# Patient Record
Sex: FEMALE | Race: WHITE | NOT HISPANIC OR LATINO | Employment: UNEMPLOYED | URBAN - METROPOLITAN AREA
[De-identification: names, ages, dates, MRNs, and addresses within clinical notes are randomized per-mention and may not be internally consistent; named-entity substitution may affect disease eponyms.]

---

## 2019-01-21 ENCOUNTER — HOSPITAL ENCOUNTER (EMERGENCY)
Facility: HOSPITAL | Age: 49
Discharge: HOME | End: 2019-01-21
Attending: EMERGENCY MEDICINE
Payer: MEDICARE

## 2019-01-21 VITALS
RESPIRATION RATE: 18 BRPM | DIASTOLIC BLOOD PRESSURE: 79 MMHG | SYSTOLIC BLOOD PRESSURE: 160 MMHG | HEART RATE: 81 BPM | WEIGHT: 190 LBS | OXYGEN SATURATION: 99 % | TEMPERATURE: 97.3 F

## 2019-01-21 DIAGNOSIS — Z76.89 ENCOUNTER FOR PSYCHIATRIC ASSESSMENT: Primary | ICD-10-CM

## 2019-01-21 LAB
AMPHETAMINES SERPL QL SCN: NEGATIVE
BARBITURATES UR QL: NEGATIVE
BENZODIAZ UR QL: NEGATIVE
COCAINE UR QL: NEGATIVE
ETHANOL EXG-MCNC: 0 MG/DL
METHADONE UR QL: NEGATIVE
OPIATES UR QL SCN: NEGATIVE
PCP UR QL: NEGATIVE
THC UR QL: NEGATIVE

## 2019-01-21 PROCEDURE — 99284 EMERGENCY DEPT VISIT MOD MDM: CPT

## 2019-01-21 PROCEDURE — 82075 ASSAY OF BREATH ETHANOL: CPT | Performed by: EMERGENCY MEDICINE

## 2019-01-21 PROCEDURE — 80307 DRUG TEST PRSMV CHEM ANLYZR: CPT | Performed by: EMERGENCY MEDICINE

## 2019-01-21 NOTE — ED NOTES
Pt is a 51 y o  female who was brought to the ED with   Chief Complaint   Patient presents with    Psychiatric Evaluation     patient arrives with  who states "she is here to sign a 201", patient states "i'm here becasue I got a 30 day notice from the place I live so I took my bag and I left and they came after me and said they had to call crisis and the police, I agreed to stay one more night but then I tried to leave today and they told me they can't discharge me but they already kicked me out and I need to get to a shelter", patient denies SI/HI/AH/VH   Pt brought to the ED via [facility] [address] with complaints of leaving the assisted living with out a plan  Pt reports that she was given a 30 day notice (due to not wanting to have a medical exam), pt tried [address] yesterday but was talked into staying another night, today pt has decieded to leave and walked down the road (in the cold) staff cold the police and county crisis  Pt denies S/I,H/I,A/H,V/H CW spoke Lakisha ([facility] [employer] staff) who informed me that pt has a guardian (pt mother) who is working with [facility]  to come up with a plan  And that [place] crisis was called due to pt making statements of wanting to die if she has to stay at the [facility] (pt contimues to deny this statement)Pt reports that she want to stay at the shelter and that she has made calls to the shelter about getting  in the shelter  Intake Assessment completed, Safety risk Assessment completed  CW met with pt and discussed what happened today, pt reports that she wants to leave the [employer] and go to a shelter  CW spoke with [facility]  [place] who informed me that they have petition for a 36, CW spoke with [facility] [place] who informed me that [facility] did petition but 36 has not been delegated, at this time due information on petition not being accurate  ED Physician informed me that 36 would not be upheld at this time   CW informed [employer] admin, and [facility] Social Work, and [facility]   Pt will be discharged per ED Physician, Pt refused any and all referrals from [facility] Crisis Worker

## 2019-01-21 NOTE — ED PROVIDER NOTES
History  Chief Complaint   Patient presents with    Psychiatric Evaluation     patient arrives with  who states "she is here to sign a 201", patient states "i'm here becasue I got a 30 day notice from the place I live so I took my bag and I left and they came after me and said they had to call crisis and the police, I agreed to stay one more night but then I tried to leave today and they told me they can't discharge me but they already kicked me out and I need to get to a shelter", patient denies SI/HI/AH/VH     HPI      76-year-old female with history of schizophrenia presents after trying to leave her group home  Patient tried to walk or shelter  Patient states she does not want live there  Patient was given a 30 day event shin notice by group home  Patient has a guardianship of her mother  Patient denies homicidal suicidal ideation  Patient denies auditory visual hallucinations  Patient states she has been taking her psychiatric medications  Patient follows up regularly with Psychiatry therapy  Patient states she has not been recently admitted  Patient states she is able to care for self  Patient is alert oriented x3  Patient denies alcohol or drug use  Patient's physical symptoms at this time  Patient denies fever chills rigors headache lightheadedness dizziness chest pain palpitations shortness of breath cough pleurisy abdominal pain nausea vomiting diarrhea constipation urinary symptoms motor weakness numbness and tingling  None       Past Medical History:   Diagnosis Date    Schizophrenia [facility])        Past Surgical History:   Procedure Laterality Date    CHOLECYSTECTOMY      DILATION AND CURETTAGE OF UTERUS         No family history on file  I have reviewed and agree with the history as documented      Social History   Substance Use Topics    Smoking status: Never Smoker    Smokeless tobacco: Never Used    Alcohol use No        Review of Systems   Neurological: Negative for dizziness, tremors, seizures, syncope, facial asymmetry, speech difficulty, weakness, light-headedness, numbness and headaches  Psychiatric/Behavioral: Negative for agitation, behavioral problems, confusion, decreased concentration, dysphoric mood, hallucinations, self-injury, sleep disturbance and suicidal ideas  The patient is not nervous/anxious and is not hyperactive  All other systems reviewed and are negative  Physical Exam  ED Triage Vitals [01/21/19 1422]   Temperature Pulse Respirations Blood Pressure SpO2   (!) 97 3 °F (36 3 °C) 81 18 160/79 99 %      Temp Source Heart Rate Source Patient Position - Orthostatic VS BP Location FiO2 (%)   Oral Monitor Sitting Left arm --      Pain Score       No Pain           Orthostatic Vital Signs  Vitals:    01/21/19 1422   BP: 160/79   Pulse: 81   Patient Position - Orthostatic VS: Sitting       Physical Exam   Constitutional: She is oriented to person, place, and time  She appears well-developed and well-nourished  No distress  HENT:   Head: Normocephalic and atraumatic  Right Ear: External ear normal    Left Ear: External ear normal    Nose: Nose normal    Mouth/Throat: Oropharynx is clear and moist  No oropharyngeal exudate  Eyes: Pupils are equal, round, and reactive to light  Conjunctivae and EOM are normal  Right eye exhibits no discharge  Left eye exhibits no discharge  No scleral icterus  Neck: Normal range of motion  Neck supple  No JVD present  No tracheal deviation present  No thyromegaly present  Cardiovascular: Normal rate, regular rhythm, normal heart sounds and intact distal pulses  No murmur heard  Pulmonary/Chest: Effort normal and breath sounds normal  No stridor  No respiratory distress  She has no wheezes  Abdominal: Soft  Bowel sounds are normal  She exhibits no distension and no mass  There is no tenderness  There is no rebound and no guarding  No hernia  Musculoskeletal: Normal range of motion   She exhibits no edema, tenderness or deformity  Lymphadenopathy:     She has no cervical adenopathy  Neurological: She is alert and oriented to person, place, and time  She displays normal reflexes  No cranial nerve deficit or sensory deficit  She exhibits normal muscle tone  GCS eye subscore is 4  GCS verbal subscore is 5  GCS motor subscore is 6  Reflex Scores:       Tricep reflexes are 2+ on the right side and 2+ on the left side  Bicep reflexes are 2+ on the right side and 2+ on the left side  Patellar reflexes are 2+ on the right side and 2+ on the left side  Achilles reflexes are 2+ on the right side and 2+ on the left side  5/5 strength in upper lower extremities, sensation intact throughout, cerebellar testing including finger-to-nose heel-to-shin rapid alternating movements are intact, cranial nerves 2-12 intact  No pronator drift  Speech is articulate  Visual fields are intact  Alert oriented x3  No signs of intoxication no clonus in all 4 extremities  Skin: Skin is warm and dry  No rash noted  She is not diaphoretic  No erythema  Psychiatric: She has a normal mood and affect  Her behavior is normal  Judgment and thought content normal  Her mood appears not anxious  Her affect is not angry, not blunt, not labile and not inappropriate  Her speech is not rapid and/or pressured, not delayed, not tangential and not slurred  She is not agitated, not aggressive, not hyperactive, not slowed, not withdrawn, not actively hallucinating and not combative  Thought content is not paranoid and not delusional  Cognition and memory are not impaired  She does not express impulsivity or inappropriate judgment  She does not exhibit a depressed mood  She expresses no homicidal and no suicidal ideation  She expresses no suicidal plans and no homicidal plans  She is communicative  She exhibits normal recent memory and normal remote memory  She is attentive  Nursing note and vitals reviewed        ED Medications  Medications - No data to display    Diagnostic Studies  Results Reviewed     Procedure Component Value Units Date/Time    Rapid drug screen, urine [[id number]]  (Normal) Collected:  01/21/19 1721    Lab Status:  Final result Specimen:  Urine from Urine, Clean Catch Updated:  01/21/19 1748     Amph/Meth UR Negative     Barbiturate Ur Negative     Benzodiazepine Urine Negative     Cocaine Urine Negative     Methadone Urine Negative     Opiate Urine Negative     PCP Ur Negative     THC Urine Negative    Narrative:         FOR MEDICAL PURPOSES ONLY  IF CONFIRMATION NEEDED PLEASE CONTACT THE LAB WITHIN 5 DAYS  Drug Screen Cutoff Levels:  AMPHETAMINE/METHAMPHETAMINES  1000 ng/mL  BARBITURATES     200 ng/mL  BENZODIAZEPINES     200 ng/mL  COCAINE      300 ng/mL  METHADONE      300 ng/mL  OPIATES      300 ng/mL  PHENCYCLIDINE     25 ng/mL  THC       50 ng/mL    POCT alcohol breath test [[id number]]  (Normal) Resulted:  01/21/19 1703    Lab Status:  Final result Updated:  01/21/19 1703     EXTBreath Alcohol 0 000                 No orders to display         Procedures  Procedures      Phone Consults  ED Phone Contact    ED Course                               MDM  Number of Diagnoses or Management Options  Encounter for psychiatric assessment:   Diagnosis management comments: A 70-year-old female presenting from group home  Patient was sleep group home assisting shelter  Group home 30 day notice for residence, group [place] social work is seeking group home for patient  Guardian of mother  Patient is alert oriented x3, no signs of intoxication  No homicidal suicidal ideation no auditory visual hallucinations  Patient does not meet 302 or 201 criteria  Crisis agrees  Patient will follow up with outpatient resources  Patient discharged back to group home  ED return precautions discussed  Patient group home staff agreed      CritCare Time    Disposition  Final diagnoses:   Encounter for psychiatric assessment     Time reflects when diagnosis was documented in both MDM as applicable and the Disposition within this note     Time User Action Codes Description Comment    1/21/2019  7:15 PM Heidi Hurst Add [Z76 89] Encounter for psychiatric assessment     1/21/2019  7:16 PM Erica Garcias [F41 9] Anxiety     1/21/2019  7:16 PM Heidi Hurst Remove [F41 9] Anxiety       ED Disposition     ED Disposition Condition Comment    Discharge  Candice May discharge to home/self care  Condition at discharge: Good    Return precautions were discussed with patient  Patient understands when to return to  Emergency department  Patient agrees to discharge plan and follow up care  Follow-up Information     Follow up With Specialties Details Why Contact Info        CRISIS resources          There are no discharge medications for this patient  No discharge procedures on file  ED Provider  Attending physically available and evaluated Candice May  I managed the patient along with the ED Attending      Electronically Signed by         Shlomo Goss DO  01/22/19 2848

## 2019-01-21 NOTE — ED ATTENDING ATTESTATION
Kirti Calhoun MD, saw and evaluated the patient  I have discussed the patient with the resident/non-physician practitioner and agree with the resident's/non-physician practitioner's findings, Plan of Care, and MDM as documented in the resident's/non-physician practitioner's note, except where noted  All available labs and Radiology studies were reviewed  At this point I agree with the current assessment done in the Emergency Department  I have conducted an independent evaluation of this patient a history and physical is as follows:    Pt was in group home  Due to inability to care for self  The group home has given her a 30 day notice due to behavior  [facility] sent her in for placement  Patient denies SI or HI patient has no plan to hurt herself she does not have any access to guns  She is not hearing or seeing voices  Patient wanted toe walk to a shelter and because of that the facility where she lives became extremely concerned about her decision making  PE alert heart regular lungs clear abdomen soft nondistended nontender MDM:  Will have crisis evaluate    Critical Care Time  CritCare Time    Procedures

## 2019-01-22 NOTE — DISCHARGE INSTRUCTIONS
Anxiety   WHAT YOU SHOULD KNOW:   Anxiety is a condition that causes you to feel excessive worry, uneasiness, or fear  Family or work stress, smoking, caffeine, and alcohol can increase your risk for anxiety  Certain medicines or health conditions can also increase your risk  Anxiety may begin gradually, and can become a long-term condition if it is not managed or treated  AFTER YOU LEAVE:   Medicines:   · Medicines  can help you feel more calm and relaxed, and decrease your symptoms  · Take your medicine as directed  Contact your healthcare provider if you think your medicine is not helping or if you have side effects  Tell him if you are allergic to any medicine  Keep a list of the medicines, vitamins, and herbs you take  Include the amounts, and when and why you take them  Bring the list or the pill bottles to follow-up visits  Carry your medicine list with you in case of an emergency  Follow up with your healthcare provider within 2 weeks or as directed:  Write down your questions so you remember to ask them during your visits  Manage anxiety:   · Go to counseling as directed  Cognitive behavioral therapy can help you understand and change how you react to events that trigger your symptoms  · Find ways to manage your symptoms  Activities such as exercise, meditation, or listening to music can help you relax  · Practice deep breathing  Breathing can change how your body reacts to stress  Focus on taking slow, deep breaths several times a day, or during an anxiety attack  Breathe in through your nose, and out through your mouth  · Avoid caffeine  Caffeine can make your symptoms worse  Avoid foods or drinks that are meant to increase your energy level  · Limit or avoid alcohol  Ask your healthcare provider if alcohol is safe for you  You may not be able to drink alcohol if you take certain anxiety or depression medicines  Limit alcohol to 1 drink per day if you are a woman   Limit alcohol to 2 drinks per day if you are a man  A drink of alcohol is 12 ounces of beer, 5 ounces of wine, or 1½ ounces of liquor  Contact your healthcare provider if:   · Your symptoms get worse or do not get better with treatment  · You think your medicine may be causing side effects  · Your anxiety keeps you from doing your regular daily activities  · You have new symptoms since your last visit  · You have questions or concerns about your condition or care  Seek care immediately or call 911 if:   · You have chest pain, tightness, or heaviness that may spread to your shoulders, arms, jaw, neck, or back  · You feel like hurting yourself or someone else  · You feel dizzy, lightheaded, or faint  © 2014 [address] [employer] is for End User's use only and may not be sold, redistributed or otherwise used for commercial purposes  All illustrations and images included in CareNotes® are the copyrighted property of A D A M , Inc  or Matthew Albarran  The above information is an  only  It is not intended as medical advice for individual conditions or treatments  Talk to your doctor, nurse or pharmacist before following any medical regimen to see if it is safe and effective for you

## 2019-10-03 ENCOUNTER — HOSPITAL ENCOUNTER (INPATIENT)
Facility: HOSPITAL | Age: 49
LOS: 1 days | Discharge: HOME | DRG: 885 | End: 2019-10-04
Attending: EMERGENCY MEDICINE | Admitting: INTERNAL MEDICINE
Payer: MEDICARE

## 2019-10-03 ENCOUNTER — APPOINTMENT (INPATIENT)
Dept: RADIOLOGY | Facility: HOSPITAL | Age: 49
DRG: 885 | End: 2019-10-03
Payer: MEDICARE

## 2019-10-03 DIAGNOSIS — F20.9 SCHIZOPHRENIA (HCC): ICD-10-CM

## 2019-10-03 DIAGNOSIS — E87.6 HYPOKALEMIA: Primary | ICD-10-CM

## 2019-10-03 DIAGNOSIS — Z91.14 NONCOMPLIANCE WITH MEDICATIONS: ICD-10-CM

## 2019-10-03 DIAGNOSIS — F50.9 EATING DISORDER, UNSPECIFIED TYPE: ICD-10-CM

## 2019-10-03 PROBLEM — E87.1 HYPONATREMIA: Status: ACTIVE | Noted: 2019-10-03

## 2019-10-03 LAB
ANION GAP SERPL CALCULATED.3IONS-SCNC: 12 MMOL/L (ref 4–13)
ANION GAP SERPL CALCULATED.3IONS-SCNC: 17 MMOL/L (ref 4–13)
BACTERIA UR QL AUTO: ABNORMAL /HPF
BASOPHILS # BLD AUTO: 0.01 THOUSANDS/ΜL (ref 0–0.1)
BASOPHILS NFR BLD AUTO: 0 % (ref 0–1)
BILIRUB UR QL STRIP: NEGATIVE
BUN SERPL-MCNC: 4 MG/DL (ref 5–25)
BUN SERPL-MCNC: 7 MG/DL (ref 5–25)
CALCIUM SERPL-MCNC: 8.3 MG/DL (ref 8.3–10.1)
CALCIUM SERPL-MCNC: 9.3 MG/DL (ref 8.3–10.1)
CHLORIDE SERPL-SCNC: 102 MMOL/L (ref 100–108)
CHLORIDE SERPL-SCNC: 89 MMOL/L (ref 100–108)
CLARITY UR: CLEAR
CO2 SERPL-SCNC: 21 MMOL/L (ref 21–32)
CO2 SERPL-SCNC: 22 MMOL/L (ref 21–32)
COLOR UR: ABNORMAL
CREAT SERPL-MCNC: 0.64 MG/DL (ref 0.6–1.3)
CREAT SERPL-MCNC: 0.85 MG/DL (ref 0.6–1.3)
EOSINOPHIL # BLD AUTO: 0.04 THOUSAND/ΜL (ref 0–0.61)
EOSINOPHIL NFR BLD AUTO: 1 % (ref 0–6)
ERYTHROCYTE [DISTWIDTH] IN BLOOD BY AUTOMATED COUNT: 13.9 % (ref 11.6–15.1)
ETHANOL EXG-MCNC: 0 MG/DL
GFR SERPL CREATININE-BSD FRML MDRD: 105 ML/MIN/1.73SQ M
GFR SERPL CREATININE-BSD FRML MDRD: 81 ML/MIN/1.73SQ M
GLUCOSE SERPL-MCNC: 52 MG/DL (ref 65–140)
GLUCOSE SERPL-MCNC: 61 MG/DL (ref 65–140)
GLUCOSE UR STRIP-MCNC: NEGATIVE MG/DL
HCT VFR BLD AUTO: 35.8 % (ref 34.8–46.1)
HGB BLD-MCNC: 11.9 G/DL (ref 11.5–15.4)
HGB UR QL STRIP.AUTO: NEGATIVE
IMM GRANULOCYTES # BLD AUTO: 0.03 THOUSAND/UL (ref 0–0.2)
IMM GRANULOCYTES NFR BLD AUTO: 0 % (ref 0–2)
KETONES UR STRIP-MCNC: ABNORMAL MG/DL
LEUKOCYTE ESTERASE UR QL STRIP: ABNORMAL
LYMPHOCYTES # BLD AUTO: 1.12 THOUSANDS/ΜL (ref 0.6–4.47)
LYMPHOCYTES NFR BLD AUTO: 16 % (ref 14–44)
MAGNESIUM SERPL-MCNC: 1.5 MG/DL (ref 1.6–2.6)
MCH RBC QN AUTO: 29.7 PG (ref 26.8–34.3)
MCHC RBC AUTO-ENTMCNC: 33.2 G/DL (ref 31.4–37.4)
MCV RBC AUTO: 89 FL (ref 82–98)
MONOCYTES # BLD AUTO: 0.4 THOUSAND/ΜL (ref 0.17–1.22)
MONOCYTES NFR BLD AUTO: 6 % (ref 4–12)
NEUTROPHILS # BLD AUTO: 5.42 THOUSANDS/ΜL (ref 1.85–7.62)
NEUTS SEG NFR BLD AUTO: 77 % (ref 43–75)
NITRITE UR QL STRIP: NEGATIVE
NON-SQ EPI CELLS URNS QL MICRO: ABNORMAL /HPF
OSMOLALITY UR: 77 MMOL/KG
PH UR STRIP.AUTO: 5.5 [PH]
PLATELET # BLD AUTO: 347 THOUSANDS/UL (ref 149–390)
PMV BLD AUTO: 9.9 FL (ref 8.9–12.7)
POTASSIUM SERPL-SCNC: 2.4 MMOL/L (ref 3.5–5.3)
POTASSIUM SERPL-SCNC: 4.4 MMOL/L (ref 3.5–5.3)
POTASSIUM UR-SCNC: 1.4 MMOL/L
PROT UR STRIP-MCNC: NEGATIVE MG/DL
RBC # BLD AUTO: 4.01 MILLION/UL (ref 3.81–5.12)
RBC #/AREA URNS AUTO: ABNORMAL /HPF
SODIUM 24H UR-SCNC: 13 MOL/L
SODIUM SERPL-SCNC: 128 MMOL/L (ref 136–145)
SODIUM SERPL-SCNC: 135 MMOL/L (ref 136–145)
SP GR UR STRIP.AUTO: <=1.005 (ref 1–1.03)
TSH SERPL DL<=0.05 MIU/L-ACNC: 3.88 UIU/ML (ref 0.36–3.74)
URATE UR-MCNC: 4.4 MG/DL
UROBILINOGEN UR QL STRIP.AUTO: 0.2 E.U./DL
WBC # BLD AUTO: 7.02 THOUSAND/UL (ref 4.31–10.16)
WBC #/AREA URNS AUTO: ABNORMAL /HPF

## 2019-10-03 PROCEDURE — 80048 BASIC METABOLIC PNL TOTAL CA: CPT | Performed by: PHYSICIAN ASSISTANT

## 2019-10-03 PROCEDURE — 84133 ASSAY OF URINE POTASSIUM: CPT | Performed by: INTERNAL MEDICINE

## 2019-10-03 PROCEDURE — 71045 X-RAY EXAM CHEST 1 VIEW: CPT

## 2019-10-03 PROCEDURE — 84560 ASSAY OF URINE/URIC ACID: CPT | Performed by: INTERNAL MEDICINE

## 2019-10-03 PROCEDURE — 36415 COLL VENOUS BLD VENIPUNCTURE: CPT | Performed by: PHYSICIAN ASSISTANT

## 2019-10-03 PROCEDURE — 80048 BASIC METABOLIC PNL TOTAL CA: CPT | Performed by: INTERNAL MEDICINE

## 2019-10-03 PROCEDURE — 87081 CULTURE SCREEN ONLY: CPT

## 2019-10-03 PROCEDURE — 93005 ELECTROCARDIOGRAM TRACING: CPT

## 2019-10-03 PROCEDURE — 99285 EMERGENCY DEPT VISIT HI MDM: CPT

## 2019-10-03 PROCEDURE — 84300 ASSAY OF URINE SODIUM: CPT | Performed by: INTERNAL MEDICINE

## 2019-10-03 PROCEDURE — 81001 URINALYSIS AUTO W/SCOPE: CPT | Performed by: INTERNAL MEDICINE

## 2019-10-03 PROCEDURE — 82075 ASSAY OF BREATH ETHANOL: CPT | Performed by: PHYSICIAN ASSISTANT

## 2019-10-03 PROCEDURE — 83735 ASSAY OF MAGNESIUM: CPT | Performed by: INTERNAL MEDICINE

## 2019-10-03 PROCEDURE — 96374 THER/PROPH/DIAG INJ IV PUSH: CPT

## 2019-10-03 PROCEDURE — 99285 EMERGENCY DEPT VISIT HI MDM: CPT | Performed by: PHYSICIAN ASSISTANT

## 2019-10-03 PROCEDURE — 83935 ASSAY OF URINE OSMOLALITY: CPT | Performed by: INTERNAL MEDICINE

## 2019-10-03 PROCEDURE — 99223 1ST HOSP IP/OBS HIGH 75: CPT | Performed by: INTERNAL MEDICINE

## 2019-10-03 PROCEDURE — 85025 COMPLETE CBC W/AUTO DIFF WBC: CPT | Performed by: PHYSICIAN ASSISTANT

## 2019-10-03 PROCEDURE — 84443 ASSAY THYROID STIM HORMONE: CPT | Performed by: INTERNAL MEDICINE

## 2019-10-03 PROCEDURE — 94664 DEMO&/EVAL PT USE INHALER: CPT

## 2019-10-03 RX ORDER — ACETAMINOPHEN 325 MG/1
650 TABLET ORAL EVERY 6 HOURS PRN
Status: DISCONTINUED | OUTPATIENT
Start: 2019-10-03 | End: 2019-10-04 | Stop reason: HOSPADM

## 2019-10-03 RX ORDER — POLYETHYLENE GLYCOL 3350 17 G/17G
17 POWDER, FOR SOLUTION ORAL DAILY
COMMUNITY

## 2019-10-03 RX ORDER — FOLIC ACID 1 MG/1
TABLET ORAL DAILY
COMMUNITY

## 2019-10-03 RX ORDER — POTASSIUM CHLORIDE 20 MEQ/1
40 TABLET, EXTENDED RELEASE ORAL EVERY 4 HOURS
Status: COMPLETED | OUTPATIENT
Start: 2019-10-03 | End: 2019-10-03

## 2019-10-03 RX ORDER — LORATADINE 10 MG/1
10 TABLET ORAL DAILY
Status: DISCONTINUED | OUTPATIENT
Start: 2019-10-04 | End: 2019-10-04 | Stop reason: HOSPADM

## 2019-10-03 RX ORDER — CHLORAL HYDRATE 500 MG
1000 CAPSULE ORAL DAILY
COMMUNITY

## 2019-10-03 RX ORDER — FOLIC ACID 1 MG/1
1 TABLET ORAL DAILY
Status: DISCONTINUED | OUTPATIENT
Start: 2019-10-04 | End: 2019-10-04 | Stop reason: HOSPADM

## 2019-10-03 RX ORDER — MELATONIN
1000 DAILY
COMMUNITY

## 2019-10-03 RX ORDER — FLUTICASONE PROPIONATE 110 UG/1
1 AEROSOL, METERED RESPIRATORY (INHALATION)
Status: DISCONTINUED | OUTPATIENT
Start: 2019-10-03 | End: 2019-10-04 | Stop reason: HOSPADM

## 2019-10-03 RX ORDER — LORATADINE 10 MG/1
10 TABLET ORAL DAILY
COMMUNITY

## 2019-10-03 RX ORDER — POTASSIUM CHLORIDE AND SODIUM CHLORIDE 900; 300 MG/100ML; MG/100ML
125 INJECTION, SOLUTION INTRAVENOUS CONTINUOUS
Status: DISCONTINUED | OUTPATIENT
Start: 2019-10-03 | End: 2019-10-04

## 2019-10-03 RX ORDER — POTASSIUM CHLORIDE 20 MEQ/1
20 TABLET, EXTENDED RELEASE ORAL ONCE
Status: COMPLETED | OUTPATIENT
Start: 2019-10-03 | End: 2019-10-03

## 2019-10-03 RX ORDER — FLUOXETINE 10 MG/1
30 TABLET, FILM COATED ORAL DAILY
COMMUNITY
End: 2019-10-14 | Stop reason: HOSPADM

## 2019-10-03 RX ORDER — POTASSIUM CHLORIDE 14.9 MG/ML
20 INJECTION INTRAVENOUS ONCE
Status: DISCONTINUED | OUTPATIENT
Start: 2019-10-03 | End: 2019-10-03

## 2019-10-03 RX ORDER — MAGNESIUM SULFATE HEPTAHYDRATE 40 MG/ML
2 INJECTION, SOLUTION INTRAVENOUS ONCE
Status: COMPLETED | OUTPATIENT
Start: 2019-10-03 | End: 2019-10-03

## 2019-10-03 RX ORDER — OLANZAPINE 5 MG/1
20 TABLET ORAL
Status: DISCONTINUED | OUTPATIENT
Start: 2019-10-03 | End: 2019-10-04 | Stop reason: HOSPADM

## 2019-10-03 RX ORDER — POTASSIUM CHLORIDE 14.9 MG/ML
20 INJECTION INTRAVENOUS ONCE
Status: COMPLETED | OUTPATIENT
Start: 2019-10-03 | End: 2019-10-03

## 2019-10-03 RX ORDER — ONDANSETRON 2 MG/ML
4 INJECTION INTRAMUSCULAR; INTRAVENOUS EVERY 6 HOURS PRN
Status: DISCONTINUED | OUTPATIENT
Start: 2019-10-03 | End: 2019-10-04 | Stop reason: HOSPADM

## 2019-10-03 RX ORDER — POLYETHYLENE GLYCOL 3350 17 G/17G
17 POWDER, FOR SOLUTION ORAL DAILY
Status: DISCONTINUED | OUTPATIENT
Start: 2019-10-04 | End: 2019-10-04 | Stop reason: HOSPADM

## 2019-10-03 RX ORDER — OLANZAPINE 20 MG/1
20 TABLET ORAL
Status: ON HOLD | COMMUNITY
End: 2019-10-11 | Stop reason: SDUPTHER

## 2019-10-03 RX ORDER — FLUOXETINE HYDROCHLORIDE 20 MG/1
20 CAPSULE ORAL DAILY
Status: DISCONTINUED | OUTPATIENT
Start: 2019-10-04 | End: 2019-10-04 | Stop reason: HOSPADM

## 2019-10-03 RX ORDER — POTASSIUM CHLORIDE 20 MEQ/1
40 TABLET, EXTENDED RELEASE ORAL ONCE
Status: COMPLETED | OUTPATIENT
Start: 2019-10-03 | End: 2019-10-03

## 2019-10-03 RX ADMIN — POTASSIUM CHLORIDE AND SODIUM CHLORIDE 125 ML/HR: 900; 300 INJECTION, SOLUTION INTRAVENOUS at 18:20

## 2019-10-03 RX ADMIN — MAGNESIUM SULFATE HEPTAHYDRATE 2 G: 40 INJECTION, SOLUTION INTRAVENOUS at 18:20

## 2019-10-03 RX ADMIN — POTASSIUM CHLORIDE 20 MEQ: 14.9 INJECTION, SOLUTION INTRAVENOUS at 16:17

## 2019-10-03 RX ADMIN — POTASSIUM CHLORIDE 40 MEQ: 20 TABLET, EXTENDED RELEASE ORAL at 16:15

## 2019-10-03 RX ADMIN — SODIUM CHLORIDE 500 ML: 0.9 INJECTION, SOLUTION INTRAVENOUS at 16:20

## 2019-10-03 RX ADMIN — POTASSIUM CHLORIDE 20 MEQ: 20 TABLET, EXTENDED RELEASE ORAL at 22:16

## 2019-10-03 RX ADMIN — POTASSIUM CHLORIDE 20 MEQ: 1500 TABLET, EXTENDED RELEASE ORAL at 22:34

## 2019-10-03 RX ADMIN — POTASSIUM CHLORIDE 40 MEQ: 20 TABLET, EXTENDED RELEASE ORAL at 18:20

## 2019-10-03 NOTE — H&P
H&P- Juan Pablo Haskins 1970, 51 y o  female MRN: [id number]    Unit/Bed#: ED 04 A Encounter: [id number]    Primary Care Provider: No primary care provider on file  Date and time admitted to hospital: 10/3/2019  2:28 PM        * Hypokalemia  Assessment & Plan  Will order potassium supplementation both IV and orally  Repeat BMP this evening  Monitor and replete as needed  Monitor on telemetry    Noncompliance with medications  Assessment & Plan  See above    Eating disorder  Assessment & Plan  Patient has history of anorexia  Psychiatry consult    Hyponatremia  Assessment & Plan  Likely secondary to poor oral intake  IV normal saline at 125 cc an hour with potassium supplementation  Monitor labs in a m  Urine lytes and osmolality      Schizophrenia ([facility] [address] )  Assessment & Plan  On Prozac and Zyprexa  Patient is noncompliant with medications  Will request psychiatric consult    Anticipated length of stay greater than 2 midnights  Chief Complaint   Patient presents with    Psychiatric Evaluation     Patient presents to ED for psychiatric evaulation, [facility] refusing medications approx 2-3 weeks, decrease appetite  Per patient she wishes to change facilities  Pt denies SI/HI at time  States she feels safe at facility  HPI:  Juan Pablo Haskins is a 51 y o  female with history of schizophrenia disorder, eating disorder, chronic constipation presented to emergency department for evaluation behavior change from [facility]  Patient reportedly stopped taking all her medication approximately 2-3 weeks ago because she did not like the way the medications made her feel  Patient has not been eating over the past week  As per the staff at the group home patient had made an attempt to elope within the past month  Patient was sent to ER for inpatient psychiatric admission  Patient denies any suicidal or homicidal ideation    Denies any feeling of hopelessness/depression  Patient admits that she is not happy at that facility and prefers to be living in a shelter  In ER while getting medical clearance patient was found to have potassium of 2 4 and sodium of 128  Patient is admitted for medical clearance prior to psychiatric evaluation  Historical Information   Past Medical History:   Diagnosis Date    Eating disorder     Schizophrenia [facility])      Past Surgical History:   Procedure Laterality Date    CHOLECYSTECTOMY      DILATION AND CURETTAGE OF UTERUS       Social History   Social History     Substance and Sexual Activity   Alcohol Use No     Social History     Substance and Sexual Activity   Drug Use No     Social History     Tobacco Use   Smoking Status Never Smoker   Smokeless Tobacco Never Used     History reviewed  No pertinent family history      Meds/Allergies   Allergies   Allergen Reactions    Haloperidol      Other reaction(s): Unknown Reaction    Sulfa Antibiotics        Meds:    Current Facility-Administered Medications:     potassium chloride 20 mEq IVPB (premix), 20 mEq, Intravenous, Once, Bassam Britt PA-C, Last Rate: 50 mL/hr at 10/03/19 1617, 20 mEq at 10/03/19 1617    sodium chloride 0 9 % bolus 500 mL, 500 mL, Intravenous, Once, Bassam Britt PA-C, Last Rate: 500 mL/hr at 10/03/19 1620, 500 mL at 10/03/19 1620    Current Outpatient Medications:     cholecalciferol (VITAMIN D3) 1,000 units tablet, Take 1,000 Units by mouth daily, Disp: , Rfl:     FLUoxetine (PROzac) 10 MG tablet, Take 30 mg by mouth daily, Disp: , Rfl:     fluticasone (FLOVENT DISKUS) 50 MCG/BLIST diskus inhaler, Inhale 1 puff 2 (two) times a day, Disp: , Rfl:     folic acid (FOLVITE) 1 mg tablet, Take by mouth daily, Disp: , Rfl:     loratadine (CLARITIN) 10 mg tablet, Take 10 mg by mouth daily, Disp: , Rfl:     magnesium hydroxide (MILK OF MAGNESIA) 400 mg/5 mL oral suspension, Take by mouth daily as needed for constipation, Disp: , Rfl:     OLANZapine (ZyPREXA) 20 MG tablet, Take 20 mg by mouth daily at bedtime, Disp: , Rfl:     Omega-3 Fatty Acids (FISH OIL) 1,000 mg, Take 1,000 mg by mouth daily, Disp: , Rfl:     polyethylene glycol (MIRALAX) 17 g packet, Take 17 g by mouth daily, Disp: , Rfl:       (Not in a hospital admission)      Review of Systems   Constitutional: Positive for activity change and fatigue  HENT: Negative  Eyes: Negative  Respiratory: Negative  Cardiovascular: Negative  Gastrointestinal: Negative  Endocrine: Negative  Genitourinary: Negative  Musculoskeletal: Negative  Skin: Negative  Allergic/Immunologic: Negative  Neurological: Negative  Hematological: Negative  Psychiatric/Behavioral: Negative  Current Vitals:   Blood Pressure: 124/84 (10/03/19 1621)  Pulse: 63 (10/03/19 1621)  Temperature: 98 2 °F (36 8 °C) (10/03/19 1436)  Temp Source: Tympanic (10/03/19 1436)  Respirations: 18 (10/03/19 1621)  Height: 5' 6" (167 6 cm) (10/03/19 1436)  Weight - Scale: 89 4 kg (197 lb) (10/03/19 1436)  SpO2: 100 % (10/03/19 1621)  SPO2 RA Rest      ED from 10/3/2019 in [address] [place] [address] Emergency Department   SpO2  100 %   SpO2 Activity  At Rest   O2 Device  None (Room air)   O2 Flow Rate          No intake or output data in the 24 hours ending 10/03/19 1642  Body mass index is 31 8 kg/m²  Physical Exam   Constitutional: She is oriented to person, place, and time  She appears well-developed and well-nourished  No distress  HENT:   Head: Normocephalic and atraumatic  Nose: Nose normal    Mouth/Throat: Oropharynx is clear and moist    Eyes: Pupils are equal, round, and reactive to light  Conjunctivae and EOM are normal  No scleral icterus  Neck: Normal range of motion  Neck supple  No JVD present  No tracheal deviation present  Cardiovascular: Normal rate, regular rhythm, normal heart sounds and intact distal pulses     Pulmonary/Chest: Effort normal and breath sounds normal  No respiratory distress  She has no wheezes  She has no rales  She exhibits no tenderness  Abdominal: Soft  Bowel sounds are normal  She exhibits no mass  There is no tenderness  There is no rebound and no guarding  Musculoskeletal: Normal range of motion  She exhibits no edema, tenderness or deformity  Lymphadenopathy:     She has no cervical adenopathy  Neurological: She is alert and oriented to person, place, and time  No cranial nerve deficit  Coordination normal    No focal deficits   Skin: Skin is warm  No rash noted  No erythema  No pallor  Psychiatric: She has a normal mood and affect  Her behavior is normal  Judgment and thought content normal    Nursing note and vitals reviewed  Lab Results:   CBC:   Lab Results   Component Value Date    WBC 7 02 10/03/2019    HGB 11 9 10/03/2019    HCT 35 8 10/03/2019    MCV 89 10/03/2019     10/03/2019    MCH 29 7 10/03/2019    MCHC 33 2 10/03/2019    RDW 13 9 10/03/2019    MPV 9 9 10/03/2019     CMP:  Lab Results   Component Value Date    CL 89 (L) 10/03/2019    CO2 22 10/03/2019    BUN 7 10/03/2019    CREATININE 0 85 10/03/2019    CALCIUM 9 3 10/03/2019    EGFR 81 10/03/2019     No results found for: TROPONINI, CKMB, CKTOTAL  Coagulation: No results found for: PT, INR, APTT Urinalysis:No results found for: COLORU, CLARITYU, SPECGRAV, PHUR, LEUKOCYTESUR, NITRITE, PROTEINUA, GLUCOSEU, KETONESU, BILIRUBINUR, BLOODU   Amylase: No results found for: AMYLASE  Lipase: No results found for: LIPASE     Imaging: No results found  EKG, Pathology, and Other Studies: I have personally reviewed the results  VTE Pharmacologic Prophylaxis: Enoxaparin (Lovenox)  VTE Mechanical Prophylaxis: sequential compression device    Code Status: No Order    Counseling / Coordination of Care  Total floor / unit time spent today 72 minutes  Greater than 50% of total time was spent with the patient and / or family counseling and / or coordination of care       "This note has been constructed using a voice recognition system"      Amrita Viveros MD  10/3/2019, 4:42 PM

## 2019-10-03 NOTE — ED NOTES
Patient given urine cup , patient missed the cup , patient made aware of need for urine     Gem Vergara, RN  10/03/19 8090

## 2019-10-03 NOTE — ED NOTES
Critical lab result called for patient of k 2 4, MIRZA dominguez notified of result     Patricia Vazquez RN  10/03/19 8152

## 2019-10-03 NOTE — ASSESSMENT & PLAN NOTE
Likely secondary to poor oral intake  IV normal saline at 125 cc an hour with potassium supplementation  Monitor labs in a m    Urine lytes and osmolality

## 2019-10-03 NOTE — ED PROVIDER NOTES
History  Chief Complaint   Patient presents with    Psychiatric Evaluation     Patient presents to ED for psychiatric evaulation, pt is coming to ED from [facility] [place] [facility] for refusing medications approx 2-3 weeks, decrease appetite  Per patient she wishes to change facilities  Pt denies SI/HI at time  States she feels safe at facility  52 yo female w/ hx of eating disorder and schizophrenia presents to the Emergency Department for evaluation of behavior change  Pt reportedly stopped taking all of her medications approx 2-3 weeks ago, states that she doesn't like the way they make her feel  She has also not been eating over the past week  Pt is a resident of [facility]; per staff, she has also made an elopement attempt within the past month  They feel she will require psychiatric admission for further care  Pt denies SI, HI, AH, VH; no reports of increased depression or hopelessness  She states that she simply is not happy at [facility] and would prefer to be "living in a shelter"  Prior to Admission Medications   Prescriptions Last Dose Informant Patient Reported? Taking?    FLUoxetine (PROzac) 10 MG tablet   Yes Yes   Sig: Take 30 mg by mouth daily   OLANZapine (ZyPREXA) 20 MG tablet   Yes Yes   Sig: Take 20 mg by mouth daily at bedtime   Omega-3 Fatty Acids (FISH OIL) 1,000 mg   Yes Yes   Sig: Take 1,000 mg by mouth daily   cholecalciferol (VITAMIN D3) 1,000 units tablet   Yes Yes   Sig: Take 1,000 Units by mouth daily   fluticasone (FLOVENT DISKUS) 50 MCG/BLIST diskus inhaler   Yes Yes   Sig: Inhale 1 puff 2 (two) times a day   folic acid (FOLVITE) 1 mg tablet   Yes Yes   Sig: Take by mouth daily   loratadine (CLARITIN) 10 mg tablet   Yes Yes   Sig: Take 10 mg by mouth daily   magnesium hydroxide (MILK OF MAGNESIA) 400 mg/5 mL oral suspension   Yes Yes   Sig: Take by mouth daily as needed for constipation   polyethylene glycol (MIRALAX) 17 g packet   Yes Yes   Sig: Take 17 g by mouth daily      Facility-Administered Medications: None       Past Medical History:   Diagnosis Date    Eating disorder     Schizophrenia [facility])        Past Surgical History:   Procedure Laterality Date    CHOLECYSTECTOMY      DILATION AND CURETTAGE OF UTERUS         History reviewed  No pertinent family history  I have reviewed and agree with the history as documented  Social History     Tobacco Use    Smoking status: Never Smoker    Smokeless tobacco: Never Used   Substance Use Topics    Alcohol use: No    Drug use: No        Review of Systems   Constitutional: Negative for chills, diaphoresis and fever  Eyes: Negative for visual disturbance  Respiratory: Negative for cough and shortness of breath  Cardiovascular: Negative for chest pain and palpitations  Gastrointestinal: Negative for abdominal pain, diarrhea, nausea and vomiting  Genitourinary: Negative for dysuria, flank pain and frequency  Musculoskeletal: Negative for arthralgias and myalgias  Skin: Negative for color change, rash and wound  Allergic/Immunologic: Negative for immunocompromised state  Neurological: Negative for dizziness and light-headedness  Hematological: Does not bruise/bleed easily  Psychiatric/Behavioral: Negative for confusion, sleep disturbance and suicidal ideas  The patient is not nervous/anxious  Physical Exam  Physical Exam   Constitutional: She is oriented to person, place, and time  No distress  Thin, well nourished   HENT:   Head: Normocephalic and atraumatic  Mouth/Throat: Oropharynx is clear and moist    Eyes: Pupils are equal, round, and reactive to light  No scleral icterus  Neck: No JVD present  Cardiovascular: Normal rate and regular rhythm  Exam reveals no gallop and no friction rub  No murmur heard  Pulmonary/Chest: No respiratory distress  She has no wheezes  She has no rales  Abdominal: Soft  Bowel sounds are normal  She exhibits no distension and no mass   There is no tenderness  There is no rebound and no guarding  Musculoskeletal: She exhibits no edema  Neurological: She is alert and oriented to person, place, and time  No cranial nerve deficit  Skin: Skin is warm and dry  Capillary refill takes less than 2 seconds  She is not diaphoretic  No pallor  Psychiatric: She has a normal mood and affect  Her behavior is normal    Vitals reviewed        Vital Signs  ED Triage Vitals [10/03/19 1436]   Temperature Pulse Respirations Blood Pressure SpO2   98 2 °F (36 8 °C) 71 19 140/76 98 %      Temp Source Heart Rate Source Patient Position - Orthostatic VS BP Location FiO2 (%)   Tympanic Monitor Lying Right arm --      Pain Score       No Pain           Vitals:    10/03/19 1436 10/03/19 1621   BP: 140/76 124/84   Pulse: 71 63   Patient Position - Orthostatic VS: Lying          Visual Acuity      ED Medications  Medications   potassium chloride 20 mEq IVPB (premix) (20 mEq Intravenous New Bag 10/3/19 1617)   sodium chloride 0 9 % bolus 500 mL (500 mL Intravenous New Bag 10/3/19 1620)   potassium chloride (K-DUR,KLOR-CON) CR tablet 40 mEq (40 mEq Oral Given 10/3/19 1615)       Diagnostic Studies  Results Reviewed     Procedure Component Value Units Date/Time    MRSA culture [[id number]] Collected:  10/03/19 1638    Lab Status:  No result Specimen:  Nose     Basic metabolic panel [[id number]]  (Abnormal) Collected:  10/03/19 1503    Lab Status:  Final result Specimen:  Blood from Arm, Right Updated:  10/03/19 1559     Sodium 128 mmol/L      Potassium 2 4 mmol/L      Chloride 89 mmol/L      CO2 22 mmol/L      ANION GAP 17 mmol/L      BUN 7 mg/dL      Creatinine 0 85 mg/dL      Glucose 61 mg/dL      Calcium 9 3 mg/dL      eGFR 81 ml/min/1 73sq m     Narrative:       [place] guidelines for Chronic Kidney Disease (CKD):     Stage 1 with normal or high GFR (GFR > 90 mL/min/1 73 square meters)    Stage 2 Mild CKD (GFR = 60-89 mL/min/1 73 square meters)    Stage 3A Moderate CKD (GFR = 45-59 mL/min/1 73 square meters)    Stage 3B Moderate CKD (GFR = 30-44 mL/min/1 73 square meters)    Stage 4 Severe CKD (GFR = 15-29 mL/min/1 73 square meters)    Stage 5 End Stage CKD (GFR <15 mL/min/1 73 square meters)  Note: GFR calculation is accurate only with a steady state creatinine    CBC and differential [[id number]]  (Abnormal) Collected:  10/03/19 1503    Lab Status:  Final result Specimen:  Blood from Arm, Right Updated:  10/03/19 1530     WBC 7 02 Thousand/uL      RBC 4 01 Million/uL      Hemoglobin 11 9 g/dL      Hematocrit 35 8 %      MCV 89 fL      MCH 29 7 pg      MCHC 33 2 g/dL      RDW 13 9 %      MPV 9 9 fL      Platelets 589 Thousands/uL      Neutrophils Relative 77 %      Immat GRANS % 0 %      Lymphocytes Relative 16 %      Monocytes Relative 6 %      Eosinophils Relative 1 %      Basophils Relative 0 %      Neutrophils Absolute 5 42 Thousands/µL      Immature Grans Absolute 0 03 Thousand/uL      Lymphocytes Absolute 1 12 Thousands/µL      Monocytes Absolute 0 40 Thousand/µL      Eosinophils Absolute 0 04 Thousand/µL      Basophils Absolute 0 01 Thousands/µL     POCT alcohol breath test [[id number]]  (Normal) Resulted:  10/03/19 1508    Lab Status:  Final result Updated:  10/03/19 1508     EXTBreath Alcohol 0 00    Rapid drug screen, urine [[id number]]     Lab Status:  No result Specimen:  Urine     POCT pregnancy, urine [[id number]]     Lab Status:  No result                  XR chest portable    (Results Pending)              Procedures  ECG 12 Lead Documentation Only  Date/Time: 10/3/2019 4:25 PM  Performed by: Roberto Carlos Alfredo PA-C  Authorized by: Roberto Carlos Alfredo PA-C     Indications / Diagnosis:  Hypokalemia  ECG reviewed by me, the ED Provider: yes    Patient location:  ED  Previous ECG:     Previous ECG:  Unavailable  Interpretation:     Interpretation: normal    Rate:     ECG rate:  69    ECG rate assessment: normal    Rhythm:     Rhythm: sinus rhythm    Ectopy: Ectopy: none    QRS:     QRS axis:  Normal    QRS intervals:  Normal  Conduction:     Conduction: normal    ST segments:     ST segments:  Normal  T waves:     T waves: normal             ED Course                               MDM  Number of Diagnoses or Management Options  Hypokalemia: new and requires workup  Schizophrenia [facility]):   Diagnosis management comments: Pt found to be profoundly hypokalemic during medical workup for psychiatric issues  EKG without changes  Will be admitted to medicine for ongoing repletion       Amount and/or Complexity of Data Reviewed  Clinical lab tests: ordered and reviewed  Tests in the radiology section of CPT®: ordered and reviewed  Tests in the medicine section of CPT®: ordered and reviewed  Review and summarize past medical records: yes  Discuss the patient with other providers: yes  Independent visualization of images, tracings, or specimens: yes        Disposition  Final diagnoses:   Hypokalemia   Schizophrenia ([facility] )     Time reflects when diagnosis was documented in both MDM as applicable and the Disposition within this note     Time User Action Codes Description Comment    10/3/2019  4:06 PM Pietro Ham Add [E87 6] Hypokalemia     10/3/2019  4:26 PM Pietro Ham Add [F20 9] Schizophrenia ([facility] 75 )     10/3/2019  4:47 PM Daron General Add [F50 9] Eating disorder, unspecified type     10/3/2019  4:47 PM Andres Michaels [Z91 14] Noncompliance with medications       ED Disposition     ED Disposition Condition Date/Time Comment    Admit Stable Thu Oct 3, 2019  4:26 PM Case was discussed with Dr Shanna Sanchez and the patient's admission status was agreed to be Admission Status: inpatient status to the service of Dr Shanna Sanchez   Follow-up Information    None         Patient's Medications   Discharge Prescriptions    No medications on file     No discharge procedures on file      ED Provider  Electronically Signed by           Andrzej Leger PA-C  10/03/19 6447 [facility], [place]  10/03/19 2841

## 2019-10-03 NOTE — ASSESSMENT & PLAN NOTE
Will order potassium supplementation both IV and orally  Repeat BMP this evening  Monitor and replete as needed  Monitor on telemetry

## 2019-10-03 NOTE — RESPIRATORY THERAPY NOTE
RT Protocol Note  Jewels Grubbs 51 y o  female MRN: [id number]  Unit/Bed#: [address] [place] [address] [id number] Encounter: [id number]    Assessment    Principal Problem:    Hypokalemia  Active Problems:    Schizophrenia ([facility] )    Hyponatremia    Eating disorder    Noncompliance with medications      Home Pulmonary Medications:  yes       Past Medical History:   Diagnosis Date    Eating disorder     Schizophrenia ([facility] )      Social History     Socioeconomic History    Marital status: Single     Spouse name: None    Number of children: None    Years of education: None    Highest education level: None   Occupational History    None   Social Needs    Financial resource strain: None    Food insecurity:     Worry: None     Inability: None    Transportation needs:     Medical: None     Non-medical: None   Tobacco Use    Smoking status: Never Smoker    Smokeless tobacco: Never Used   Substance and Sexual Activity    Alcohol use: No    Drug use: No    Sexual activity: None   Lifestyle    Physical activity:     Days per week: None     Minutes per session: None    Stress: None   Relationships    Social connections:     Talks on phone: None     Gets together: None     Attends [employer] service: None     Active member of club or organization: None     Attends meetings of clubs or organizations: None     Relationship status: None    Intimate partner violence:     Fear of current or ex partner: None     Emotionally abused: None     Physically abused: None     Forced sexual activity: None   Other Topics Concern    None   Social History Narrative    None       Subjective         Objective    Physical Exam:   Assessment Type: Assess only  General Appearance: Awake, Alert  Respiratory Pattern: Normal  Chest Assessment: Chest expansion symmetrical  Bilateral Breath Sounds: Clear, Diminished  Cough: None    Vitals:  Blood pressure 124/84, pulse 63, temperature 98 2 °F (36 8 °C), temperature source Tympanic, resp   rate 18, height 5' 6" (1 676 m), weight 89 4 kg (197 lb), SpO2 100 %  Imaging and other studies: I have personally reviewed pertinent reports              Plan    Respiratory Plan: Discontinue Protocol, No distress/Pulmonary history

## 2019-10-04 ENCOUNTER — HOSPITAL ENCOUNTER (INPATIENT)
Facility: HOSPITAL | Age: 49
LOS: 10 days | Discharge: HOME | DRG: 885 | End: 2019-10-14
Attending: PSYCHIATRY & NEUROLOGY | Admitting: PSYCHIATRY & NEUROLOGY
Payer: MEDICARE

## 2019-10-04 VITALS
BODY MASS INDEX: 22.22 KG/M2 | RESPIRATION RATE: 18 BRPM | DIASTOLIC BLOOD PRESSURE: 72 MMHG | OXYGEN SATURATION: 90 % | SYSTOLIC BLOOD PRESSURE: 120 MMHG | TEMPERATURE: 98.3 F | HEIGHT: 66 IN | HEART RATE: 68 BPM | WEIGHT: 138.23 LBS

## 2019-10-04 DIAGNOSIS — F20.9 SCHIZOPHRENIA (HCC): ICD-10-CM

## 2019-10-04 DIAGNOSIS — F50.9 EATING DISORDER, UNSPECIFIED TYPE: Primary | ICD-10-CM

## 2019-10-04 LAB
ALBUMIN SERPL BCP-MCNC: 3.4 G/DL (ref 3.5–5)
ALP SERPL-CCNC: 47 U/L (ref 46–116)
ALT SERPL W P-5'-P-CCNC: 21 U/L (ref 12–78)
ANION GAP SERPL CALCULATED.3IONS-SCNC: 14 MMOL/L (ref 4–13)
AST SERPL W P-5'-P-CCNC: 19 U/L (ref 5–45)
ATRIAL RATE: 69 BPM
BASOPHILS # BLD AUTO: 0.03 THOUSANDS/ΜL (ref 0–0.1)
BASOPHILS NFR BLD AUTO: 1 % (ref 0–1)
BILIRUB SERPL-MCNC: 0.6 MG/DL (ref 0.2–1)
BUN SERPL-MCNC: 3 MG/DL (ref 5–25)
CALCIUM SERPL-MCNC: 8.2 MG/DL (ref 8.3–10.1)
CHLORIDE SERPL-SCNC: 101 MMOL/L (ref 100–108)
CO2 SERPL-SCNC: 18 MMOL/L (ref 21–32)
CREAT SERPL-MCNC: 0.67 MG/DL (ref 0.6–1.3)
EOSINOPHIL # BLD AUTO: 0.08 THOUSAND/ΜL (ref 0–0.61)
EOSINOPHIL NFR BLD AUTO: 2 % (ref 0–6)
ERYTHROCYTE [DISTWIDTH] IN BLOOD BY AUTOMATED COUNT: 13.6 % (ref 11.6–15.1)
GFR SERPL CREATININE-BSD FRML MDRD: 104 ML/MIN/1.73SQ M
GLUCOSE SERPL-MCNC: 64 MG/DL (ref 65–140)
HCT VFR BLD AUTO: 34.4 % (ref 34.8–46.1)
HGB BLD-MCNC: 11.6 G/DL (ref 11.5–15.4)
IMM GRANULOCYTES # BLD AUTO: 0.04 THOUSAND/UL (ref 0–0.2)
IMM GRANULOCYTES NFR BLD AUTO: 1 % (ref 0–2)
INR PPP: 1.21 (ref 0.84–1.19)
LYMPHOCYTES # BLD AUTO: 0.88 THOUSANDS/ΜL (ref 0.6–4.47)
LYMPHOCYTES NFR BLD AUTO: 18 % (ref 14–44)
MAGNESIUM SERPL-MCNC: 1.7 MG/DL (ref 1.6–2.6)
MCH RBC QN AUTO: 30.3 PG (ref 26.8–34.3)
MCHC RBC AUTO-ENTMCNC: 33.7 G/DL (ref 31.4–37.4)
MCV RBC AUTO: 90 FL (ref 82–98)
MONOCYTES # BLD AUTO: 0.45 THOUSAND/ΜL (ref 0.17–1.22)
MONOCYTES NFR BLD AUTO: 9 % (ref 4–12)
NEUTROPHILS # BLD AUTO: 3.41 THOUSANDS/ΜL (ref 1.85–7.62)
NEUTS SEG NFR BLD AUTO: 69 % (ref 43–75)
NRBC BLD AUTO-RTO: 0 /100 WBCS
P AXIS: 56 DEGREES
PHOSPHATE SERPL-MCNC: 2 MG/DL (ref 2.7–4.5)
PLATELET # BLD AUTO: 278 THOUSANDS/UL (ref 149–390)
PMV BLD AUTO: 9.8 FL (ref 8.9–12.7)
POTASSIUM SERPL-SCNC: 4.8 MMOL/L (ref 3.5–5.3)
PR INTERVAL: 190 MS
PROT SERPL-MCNC: 6.5 G/DL (ref 6.4–8.2)
PROTHROMBIN TIME: 15 SECONDS (ref 11.6–14.5)
QRS AXIS: 58 DEGREES
QRSD INTERVAL: 104 MS
QT INTERVAL: 400 MS
QTC INTERVAL: 428 MS
RBC # BLD AUTO: 3.83 MILLION/UL (ref 3.81–5.12)
SODIUM SERPL-SCNC: 133 MMOL/L (ref 136–145)
T WAVE AXIS: 52 DEGREES
VENTRICULAR RATE: 69 BPM
WBC # BLD AUTO: 4.89 THOUSAND/UL (ref 4.31–10.16)

## 2019-10-04 PROCEDURE — 85610 PROTHROMBIN TIME: CPT | Performed by: INTERNAL MEDICINE

## 2019-10-04 PROCEDURE — 84100 ASSAY OF PHOSPHORUS: CPT | Performed by: INTERNAL MEDICINE

## 2019-10-04 PROCEDURE — 99232 SBSQ HOSP IP/OBS MODERATE 35: CPT | Performed by: INTERNAL MEDICINE

## 2019-10-04 PROCEDURE — 99239 HOSP IP/OBS DSCHRG MGMT >30: CPT | Performed by: INTERNAL MEDICINE

## 2019-10-04 PROCEDURE — 93010 ELECTROCARDIOGRAM REPORT: CPT | Performed by: INTERNAL MEDICINE

## 2019-10-04 PROCEDURE — 83735 ASSAY OF MAGNESIUM: CPT | Performed by: INTERNAL MEDICINE

## 2019-10-04 PROCEDURE — 99221 1ST HOSP IP/OBS SF/LOW 40: CPT | Performed by: PSYCHIATRY & NEUROLOGY

## 2019-10-04 PROCEDURE — 85025 COMPLETE CBC W/AUTO DIFF WBC: CPT | Performed by: INTERNAL MEDICINE

## 2019-10-04 PROCEDURE — 94760 N-INVAS EAR/PLS OXIMETRY 1: CPT

## 2019-10-04 PROCEDURE — 80053 COMPREHEN METABOLIC PANEL: CPT | Performed by: INTERNAL MEDICINE

## 2019-10-04 RX ORDER — OLANZAPINE 10 MG/1
5 INJECTION, POWDER, LYOPHILIZED, FOR SOLUTION INTRAMUSCULAR EVERY 8 HOURS PRN
Status: DISCONTINUED | OUTPATIENT
Start: 2019-10-04 | End: 2019-10-14 | Stop reason: HOSPADM

## 2019-10-04 RX ORDER — OLANZAPINE 5 MG/1
20 TABLET ORAL
Status: CANCELLED | OUTPATIENT
Start: 2019-10-04

## 2019-10-04 RX ORDER — LORAZEPAM 1 MG/1
1 TABLET ORAL EVERY 8 HOURS PRN
Status: DISCONTINUED | OUTPATIENT
Start: 2019-10-04 | End: 2019-10-14 | Stop reason: HOSPADM

## 2019-10-04 RX ORDER — ONDANSETRON 4 MG/1
4 TABLET, ORALLY DISINTEGRATING ORAL EVERY 6 HOURS PRN
Status: DISCONTINUED | OUTPATIENT
Start: 2019-10-04 | End: 2019-10-04

## 2019-10-04 RX ORDER — BENZTROPINE MESYLATE 1 MG/1
1 TABLET ORAL EVERY 6 HOURS PRN
Status: DISCONTINUED | OUTPATIENT
Start: 2019-10-04 | End: 2019-10-14 | Stop reason: HOSPADM

## 2019-10-04 RX ORDER — OLANZAPINE 10 MG/1
20 TABLET ORAL
Status: DISCONTINUED | OUTPATIENT
Start: 2019-10-04 | End: 2019-10-06

## 2019-10-04 RX ORDER — TRAZODONE HYDROCHLORIDE 50 MG/1
50 TABLET ORAL
Status: CANCELLED | OUTPATIENT
Start: 2019-10-04

## 2019-10-04 RX ORDER — LORATADINE 10 MG/1
10 TABLET ORAL DAILY
Status: CANCELLED | OUTPATIENT
Start: 2019-10-05

## 2019-10-04 RX ORDER — LORAZEPAM 2 MG/ML
2 INJECTION INTRAMUSCULAR EVERY 6 HOURS PRN
Status: DISCONTINUED | OUTPATIENT
Start: 2019-10-04 | End: 2019-10-14 | Stop reason: HOSPADM

## 2019-10-04 RX ORDER — BENZTROPINE MESYLATE 1 MG/1
1 TABLET ORAL EVERY 6 HOURS PRN
Status: CANCELLED | OUTPATIENT
Start: 2019-10-04

## 2019-10-04 RX ORDER — FLUOXETINE HYDROCHLORIDE 20 MG/1
20 CAPSULE ORAL DAILY
Status: DISCONTINUED | OUTPATIENT
Start: 2019-10-05 | End: 2019-10-14 | Stop reason: HOSPADM

## 2019-10-04 RX ORDER — ONDANSETRON 4 MG/1
4 TABLET, ORALLY DISINTEGRATING ORAL EVERY 6 HOURS PRN
Status: CANCELLED | OUTPATIENT
Start: 2019-10-04

## 2019-10-04 RX ORDER — MAGNESIUM HYDROXIDE/ALUMINUM HYDROXICE/SIMETHICONE 120; 1200; 1200 MG/30ML; MG/30ML; MG/30ML
30 SUSPENSION ORAL EVERY 4 HOURS PRN
Status: CANCELLED | OUTPATIENT
Start: 2019-10-04

## 2019-10-04 RX ORDER — FOLIC ACID 1 MG/1
1 TABLET ORAL DAILY
Status: DISCONTINUED | OUTPATIENT
Start: 2019-10-05 | End: 2019-10-11

## 2019-10-04 RX ORDER — FLUTICASONE PROPIONATE 110 UG/1
1 AEROSOL, METERED RESPIRATORY (INHALATION)
Status: DISCONTINUED | OUTPATIENT
Start: 2019-10-04 | End: 2019-10-05

## 2019-10-04 RX ORDER — BENZTROPINE MESYLATE 1 MG/ML
1 INJECTION INTRAMUSCULAR; INTRAVENOUS EVERY 6 HOURS PRN
Status: DISCONTINUED | OUTPATIENT
Start: 2019-10-04 | End: 2019-10-14 | Stop reason: HOSPADM

## 2019-10-04 RX ORDER — FLUOXETINE HYDROCHLORIDE 20 MG/1
20 CAPSULE ORAL DAILY
Status: CANCELLED | OUTPATIENT
Start: 2019-10-05

## 2019-10-04 RX ORDER — ACETAMINOPHEN 325 MG/1
650 TABLET ORAL EVERY 6 HOURS PRN
Status: DISCONTINUED | OUTPATIENT
Start: 2019-10-04 | End: 2019-10-14 | Stop reason: HOSPADM

## 2019-10-04 RX ORDER — ONDANSETRON 2 MG/ML
4 INJECTION INTRAMUSCULAR; INTRAVENOUS EVERY 6 HOURS PRN
Status: DISCONTINUED | OUTPATIENT
Start: 2019-10-04 | End: 2019-10-14 | Stop reason: HOSPADM

## 2019-10-04 RX ORDER — FLUOXETINE HYDROCHLORIDE 20 MG/1
20 CAPSULE ORAL DAILY
Status: DISCONTINUED | OUTPATIENT
Start: 2019-10-05 | End: 2019-10-04

## 2019-10-04 RX ORDER — OLANZAPINE 10 MG/1
20 TABLET ORAL
Status: DISCONTINUED | OUTPATIENT
Start: 2019-10-04 | End: 2019-10-04

## 2019-10-04 RX ORDER — TRAZODONE HYDROCHLORIDE 50 MG/1
50 TABLET ORAL
Status: DISCONTINUED | OUTPATIENT
Start: 2019-10-04 | End: 2019-10-14 | Stop reason: HOSPADM

## 2019-10-04 RX ORDER — ACETAMINOPHEN 325 MG/1
650 TABLET ORAL EVERY 6 HOURS PRN
Status: CANCELLED | OUTPATIENT
Start: 2019-10-04

## 2019-10-04 RX ORDER — POLYETHYLENE GLYCOL 3350 17 G/17G
17 POWDER, FOR SOLUTION ORAL DAILY
Status: DISCONTINUED | OUTPATIENT
Start: 2019-10-05 | End: 2019-10-05

## 2019-10-04 RX ORDER — MAGNESIUM HYDROXIDE/ALUMINUM HYDROXICE/SIMETHICONE 120; 1200; 1200 MG/30ML; MG/30ML; MG/30ML
30 SUSPENSION ORAL EVERY 4 HOURS PRN
Status: DISCONTINUED | OUTPATIENT
Start: 2019-10-04 | End: 2019-10-14 | Stop reason: HOSPADM

## 2019-10-04 RX ORDER — LORATADINE 10 MG/1
10 TABLET ORAL DAILY
Status: DISCONTINUED | OUTPATIENT
Start: 2019-10-05 | End: 2019-10-05

## 2019-10-04 RX ORDER — OLANZAPINE 5 MG/1
7.5 TABLET ORAL EVERY 8 HOURS PRN
Status: CANCELLED | OUTPATIENT
Start: 2019-10-04

## 2019-10-04 RX ORDER — FLUTICASONE PROPIONATE 110 UG/1
1 AEROSOL, METERED RESPIRATORY (INHALATION)
Status: CANCELLED | OUTPATIENT
Start: 2019-10-04

## 2019-10-04 RX ORDER — FOLIC ACID 1 MG/1
1 TABLET ORAL DAILY
Status: CANCELLED | OUTPATIENT
Start: 2019-10-05

## 2019-10-04 RX ORDER — BENZTROPINE MESYLATE 1 MG/ML
1 INJECTION INTRAMUSCULAR; INTRAVENOUS EVERY 6 HOURS PRN
Status: CANCELLED | OUTPATIENT
Start: 2019-10-04

## 2019-10-04 RX ORDER — LORAZEPAM 1 MG/1
1 TABLET ORAL EVERY 8 HOURS PRN
Status: CANCELLED | OUTPATIENT
Start: 2019-10-04

## 2019-10-04 RX ORDER — POLYETHYLENE GLYCOL 3350 17 G/17G
17 POWDER, FOR SOLUTION ORAL DAILY
Status: CANCELLED | OUTPATIENT
Start: 2019-10-05

## 2019-10-04 RX ORDER — LORAZEPAM 2 MG/ML
2 INJECTION INTRAMUSCULAR EVERY 6 HOURS PRN
Status: CANCELLED | OUTPATIENT
Start: 2019-10-04

## 2019-10-04 RX ORDER — OLANZAPINE 10 MG/1
5 INJECTION, POWDER, LYOPHILIZED, FOR SOLUTION INTRAMUSCULAR EVERY 8 HOURS PRN
Status: CANCELLED | OUTPATIENT
Start: 2019-10-04

## 2019-10-04 RX ORDER — ONDANSETRON 2 MG/ML
4 INJECTION INTRAMUSCULAR; INTRAVENOUS EVERY 6 HOURS PRN
Status: CANCELLED | OUTPATIENT
Start: 2019-10-04

## 2019-10-04 RX ADMIN — LORATADINE 10 MG: 10 TABLET ORAL at 11:21

## 2019-10-04 RX ADMIN — FLUOXETINE 20 MG: 20 CAPSULE ORAL at 11:21

## 2019-10-04 RX ADMIN — OLANZAPINE 20 MG: 10 TABLET, FILM COATED ORAL at 21:23

## 2019-10-04 RX ADMIN — FOLIC ACID 1 MG: 1 TABLET ORAL at 11:21

## 2019-10-04 RX ADMIN — Medication 1 TABLET: at 17:14

## 2019-10-04 NOTE — ASSESSMENT & PLAN NOTE
On Prozac and Zyprexa  Patient is noncompliant with medications  Psychiatric consult appreciated  Patient is refusing medication and stated she will not take medication after discharge    Patient is going to be 302 to admit her to inpatient psychiatry unit for stabilization  Patient is medically cleared for inpatient psych admission

## 2019-10-04 NOTE — DISCHARGE SUMMARY
Transfer/Discharge- Dajuan Lorenzo 1970, 51 y o  female MRN: [id number]    Unit/Bed#: [address] [place] [address] Encounter: [id number]    Primary Care Provider: No primary care provider on file  Date and time admitted to hospital: 10/3/2019  2:28 PM        * Hypokalemia  Assessment & Plan  Was given potassium supplementation both IV and orally  Resolved  Monitor BMP  DC telemetry    Noncompliance with medications  Assessment & Plan  See above    Eating disorder  Assessment & Plan  Patient has history of anorexia  Psychiatry consult appreciated    Hyponatremia  Assessment & Plan  Likely secondary to poor oral intake  DC IV fluid  Sodium this morning is 133  Stable for discharge to behavior Health Unit        Schizophrenia [facility])  Assessment & Plan  On Prozac and Zyprexa  Patient is noncompliant with medications  Psychiatric consult appreciated  Patient is refusing medication and stated she will not take medication after discharge  Patient is going to be 302 to admit her to inpatient psychiatry unit for stabilization  Patient is medically cleared for inpatient psych admission        Hospital Course:     Dajuan Lorenzo is a 51 y o  female patient who originally presented to the hospital on   Admission Orders (From admission, onward)     Ordered        10/03/19 1627  Inpatient Admission (expected length of stay for this patient Order details is greater than two midnights)  Once                  due to shizoaffective disorder who is is noncompliant with her medications and not eating or drinking well  Patient was sent to ER for inpatient psych evaluation and possible admission  Patient in ER was found to have hypokalemia with potassium of 2 4 and sodium of 128  Patient was given potassium supplementation  Patient was placed on telemetry overnight  This morning patient's potassium is normal     Patient is seen by psychiatrist and is medically cleared for inpatient psych admission    Patient will be 302 by 2 physician for inpatient psych admission  Continue rest of the management as per psychiatrist Dr Irene Moran    Please see above list of diagnoses and related plan for additional information  Condition at Discharge:  good      Discharge instructions/Information to patient and family:   See after visit summary for information provided to patient and family  Provisions for Follow-Up Care:  See after visit summary for information related to follow-up care and any pertinent home health orders  Disposition:     Inpatient Psychiatry at [facility]       Discharge Statement:  I spent 40 minutes discharging the patient  This time was spent on the day of discharge  I had direct contact with the patient on the day of discharge  Greater than 50% of the total time was spent examining patient, answering all patient questions, arranging and discussing plan of care with patient as well as directly providing post-discharge instructions  Additional time then spent on discharge activities  Discharge Medications:  See after visit summary for reconciled discharge medications provided to patient and family        ** Please Note: This note has been constructed using a voice recognition system **

## 2019-10-04 NOTE — CONSULTS
Consultation - [facility] 51 y o  female MRN: [id number]  Unit/Bed#: [address] [place] [address] [id number] Encounter: [id number]    Assessment/Plan     Assessment:  Mood disorder r/o major depression disorder vs bipolar disorder  History of eating disorder    Plan:   1  Restart fluoxetine 20 mg daily for depression and anxiety management  2  Restart olanzapine 20 mg at bedtime for mood stabilization  3  Patient is refusing medication and said she will not take medication after discharge  I will consider initiating to physician 302 and admit her to inpatient Psychiatry unit for stabilization  Risks, benefits and possible side effects of Medications:   Risks, benefits, and possible side effects of medications explained to patient and patient verbalizes understanding  Risks of medications in pregnancy explained if female patient  Patient verbalizes understanding and agrees to notify her doctor if she becomes pregnant  Chief Complaint: "I do not want to take medication"    History of Present Illness   Physician Requesting Consult: Ana Maria Fountain MD  Reason for Consult / Principal Problem:  Psychiatric evaluation    Patient is a 51 y o  female presents with recent behavioral changes from [address]  Patient has began refusing her medication for last 2-3 weeks  She reports she do not like how this medication makes her feel  Patient also reports decline in appetite and she no longer feels hungry  Patient also attempted to elope from [address]  Today she told me she wants to stay in a shelter  We discussed how these decisions are risky for her but patient appears to have poor insight  Patient got angry when we discussed her eating disorder history and continues to verbalize that she do not have a eating disorder history  I discussed how her potassium and sodium were low and this can be risky due to medical consequences  Patient appears to have poor insight and only focused on leaving    We discussed the plan of restarting medication and patient remaining compliant with them after discharge  Patient said she will not take this medication after discharge  She will not elaborate on any specific side effect profile of this medication  We discussed the plan of changing these medication to other options but patient will not agree to that either  At this point patient remained a risk of danger to self and will need admission to inpatient psychiatry unit  Consults    Psychiatric Review Of Systems:  sleep: yes  appetite changes: yes  weight changes: yes  energy/anergy: yes  interest/pleasure/anhedonia: no  somatic symptoms: no  anxiety/panic: yes  gabbie: no  guilty/hopeless: no  self injurious behavior/risky behavior: yes    Historical Information   Past Psychiatric History:   History of prior inpatient psychiatric admission  Last admission was in 2015  Currently in treatment with outpatient psychiatry  Past Suicide attempts: yes  Past Violent behavior: no  Past Psychiatric medication trial:  Prozac, Zyprexa  Patient do not remember names of other medication trials  Substance Abuse History: denies    Family Psychiatric History: not known    Social History  Currently living at [facility] mother as support    Past Medical History:   Diagnosis Date    Eating disorder     Schizophrenia [facility])        Medical Review Of Systems:  Review of Systems    Meds/Allergies   all current active meds have been reviewed  Allergies   Allergen Reactions    Haloperidol      Other reaction(s): Unknown Reaction    Sulfa Antibiotics        Objective   Vital signs in last 24 hours:  Temp:  [97 6 °F (36 4 °C)-98 3 °F (36 8 °C)] 98 3 °F (36 8 °C)  HR:  [63-71] 68  Resp:  [16-19] 18  BP: (101-140)/(57-84) 120/72      Intake/Output Summary (Last 24 hours) at 10/4/2019 1132  Last data filed at 10/4/2019 1001  Gross per 24 hour   Intake 60 ml   Output 300 ml   Net -240 ml       Mental Status Evaluation:  Appearance:  casually dressed   Behavior:  guarded   Speech:  loud   Mood:  anxious   Affect:  constricted   Language: naming objects   Thought Process:  circumstantial   Thought Content:  obsessions   Perceptual Disturbances: None   Risk Potential: Suicidal Ideations without plan, Homicidal Ideations none and Potential for Aggression No   Sensorium:  person and place   Cognition:  grossly intact   Consciousness:  awake    Attention: attention span appeared shorter than expected for age   Intellect: normal   Fund of Knowledge: awareness of current events: fair   Insight:  limited   Judgment: limited   Muscle Strength and Tone: arm(s): bilateral   Gait/Station: in bed   Motor Activity: no abnormal movements     Lab Results: reviewed

## 2019-10-04 NOTE — PROGRESS NOTES
Progress Note - Ezra Guo 1970, 51 y o  female MRN: [id number]    Unit/Bed#: [address] [place] [address] Encounter: [id number]    Primary Care Provider: No primary care provider on file  Date and time admitted to hospital: 10/3/2019  2:28 PM        * Hypokalemia  Assessment & Plan  Was given potassium supplementation both IV and orally  Resolved  Monitor BMP  DC telemetry    Noncompliance with medications  Assessment & Plan  See above    Eating disorder  Assessment & Plan  Patient has history of anorexia  Psychiatry consult    Hyponatremia  Assessment & Plan  Likely secondary to poor oral intake  DC IV fluid  Sodium this morning is 133  Monitor labs in a m  Schizophrenia ([facility] [address] )  Assessment & Plan  On Prozac and Zyprexa  Patient is noncompliant with medications  Will request psychiatric consult  Likely need inpatient psychiatry admission  Patient is medically cleared  Labs & Imaging: I have personally reviewed pertinent reports  VTE Pharmacologic Prophylaxis: Enoxaparin (Lovenox)  VTE Mechanical Prophylaxis: sequential compression device    Code Status:   Level 1 - Full Code    Patient Centered Rounds: I have performed bedside rounds with nursing staff today  Discussions with Specialists or Other Care Team Provider:      Education and Discussions with Family / Patient:  Left message for mother    Current Length of Stay: 1 day(s)    Current Patient Status: Inpatient   Certification Statement: The patient will continue to require additional inpatient hospital stay due to see my assessment and plan  Subjective:   Patient is seen and examined at bedside  Denies any new complaints  Denies any nausea, vomiting, diarrhea or abdominal pain  Afebrile  All other ROS are negative  Objective:    Vitals: Blood pressure 120/72, pulse 68, temperature 98 3 °F (36 8 °C), temperature source Oral, resp  rate 18, height 5' 6" (1 676 m), weight 62 7 kg (138 lb 3 7 oz), SpO2 90 %  ,Body mass index is 22 31 kg/m²   SPO2 RA Rest      ED to Hosp-Admission (Current) from 10/3/2019 in [facility] Surg Unit   SpO2  90 %   SpO2 Activity  At Rest   O2 Device  None (Room air)   O2 Flow Rate          I&O:     Intake/Output Summary (Last 24 hours) at 10/4/2019 1025  Last data filed at 10/4/2019 0001  Gross per 24 hour   Intake    Output 300 ml   Net -300 ml       Physical Exam:    General- Alert, lying comfortably in bed  Not in any acute distress  HEENT- DEJUAN, EOM intact  Neck- Supple, No JVD  CVS- regular, S1 and S2 normal  Chest- Bilateral Air entry, No rhochi, crackles or wheezing present  Abdomen- soft, nontender, not distended, no guarding or rigidity, BS+  Extremities-  No pedal edema, No calf tenderness  CNS-   Alert, awake and orientedx3  No focal deficits present  Invasive Devices     Peripheral Intravenous Line            Peripheral IV 10/03/19 Right Antecubital less than 1 day                      Social History  reviewed  History reviewed  No pertinent family history   reviewed    Meds:  Current Facility-Administered Medications   Medication Dose Route Frequency Provider Last Rate Last Dose    acetaminophen (TYLENOL) tablet 650 mg  650 mg Oral Q6H PRN Blanche Scruggs MD        enoxaparin (LOVENOX) subcutaneous injection 40 mg  40 mg Subcutaneous Daily Blanche Scruggs MD        FLUoxetine (PROzac) capsule 20 mg  20 mg Oral Daily Blanche Scruggs MD        fluticasone (FLOVENT HFA) 110 MCG/ACT inhaler 1 puff  1 puff Inhalation BID Blanche Scruggs MD        folic acid (FOLVITE) tablet 1 mg  1 mg Oral Daily Blanche Scruggs MD        loratadine (CLARITIN) tablet 10 mg  10 mg Oral Daily Blanche Scruggs MD        OLANZapine (ZyPREXA) tablet 20 mg  20 mg Oral HS Blanche Scruggs MD        ondansetron (ZOFRAN) injection 4 mg  4 mg Intravenous Q6H PRN Blanche Scruggs MD        polyethylene glycol (MIRALAX) packet 17 g  17 g Oral Daily MD Crystal Caldera potassium-sodium phosphateS (K-PHOS,PHOSPHA 250) -250 mg tablet 1 tablet  1 tablet Oral TID With Meals Dangelo Valencia MD          Medications Prior to Admission   Medication    cholecalciferol (VITAMIN D3) 1,000 units tablet    FLUoxetine (PROzac) 10 MG tablet    fluticasone (FLOVENT DISKUS) 50 MCG/BLIST diskus inhaler    folic acid (FOLVITE) 1 mg tablet    loratadine (CLARITIN) 10 mg tablet    magnesium hydroxide (MILK OF MAGNESIA) 400 mg/5 mL oral suspension    OLANZapine (ZyPREXA) 20 MG tablet    Omega-3 Fatty Acids (FISH OIL) 1,000 mg    polyethylene glycol (MIRALAX) 17 g packet       Labs:  Results from last 7 days   Lab Units 10/04/19  0457 10/03/19  1503   WBC Thousand/uL 4 89 7 02   HEMOGLOBIN g/dL 11 6 11 9   HEMATOCRIT % 34 4* 35 8   PLATELETS Thousands/uL 278 347   NEUTROS PCT % 69 77*   LYMPHS PCT % 18 16   MONOS PCT % 9 6   EOS PCT % 2 1     Results from last 7 days   Lab Units 10/04/19  0457 10/03/19  2156 10/03/19  1503   POTASSIUM mmol/L 4 8 4 4 2 4*   CHLORIDE mmol/L 101 102 89*   CO2 mmol/L 18* 21 22   BUN mg/dL 3* 4* 7   CREATININE mg/dL 0 67 0 64 0 85   CALCIUM mg/dL 8 2* 8 3 9 3   ALK PHOS U/L 47  --   --    ALT U/L 21  --   --    AST U/L 19  --   --      No results found for: TROPONINI, CKMB, CKTOTAL  Results from last 7 days   Lab Units 10/04/19  0457   INR  1 21*     No results found for: Radene Yesica, SPUTUMCULTUR      Imaging:  Results for orders placed during the hospital encounter of 10/03/19   XR chest portable    Narrative CHEST     INDICATION:   hypokalemia  COMPARISON:  None    EXAM PERFORMED/VIEWS:  XR CHEST PORTABLE      FINDINGS:    Cardiomediastinal silhouette appears unremarkable  The lungs are clear  No pneumothorax or pleural effusion  Osseous structures appear within normal limits for patient age  Impression No acute cardiopulmonary disease          Workstation performed: [id number]       No results found for this or any previous visit  Last 24 Hours Medication List:     Current Facility-Administered Medications:  acetaminophen 650 mg Oral Q6H PRN Geovanni Crain MD   enoxaparin 40 mg Subcutaneous Daily Geovanni Crain MD   FLUoxetine 20 mg Oral Daily Geovanni Crain MD   fluticasone 1 puff Inhalation BID Geovanni Crain MD   folic acid 1 mg Oral Daily Geovanni Crain MD   loratadine 10 mg Oral Daily Geovanni Crain MD   OLANZapine 20 mg Oral HS Geovanni Crain MD   ondansetron 4 mg Intravenous Q6H PRN Geovanni Crain MD   polyethylene glycol 17 g Oral Daily Geovanni Crain MD   potassium-sodium phosphateS 1 tablet Oral TID With Meals Geovanni Crain MD        Today, Patient Was Seen By: Geovanni Crain MD    ** Please Note: Dictation voice to text software may have been used in the creation of this document   **

## 2019-10-04 NOTE — TREATMENT PLAN
RN will assess patient at least twice daily for symptoms of admission and educate patient on medications, diagnoses, and coping skills

## 2019-10-04 NOTE — ED NOTES
Patient is accepted at [facility]  Patient is accepted by Dr Radha Burger per   Patient may go to the floor at [phone] report is to be called to  prior to patient transfer      Assigned Medical CM will complete COB with Gautam prior to transfer

## 2019-10-04 NOTE — PROGRESS NOTES
Patient had refused meds at first but then agreed to take some of them including, Prozac, folic acid and Claritin  Patient refused lovenox, miralax, and potassium phosphate  Patient observed closely swallow pills with water and watched for 10 mins after

## 2019-10-04 NOTE — SOCIAL WORK
Called and spoke with Iram Rasmussen at [facility] and notified her of patient admission to [facility] for coordination of benefits

## 2019-10-04 NOTE — ASSESSMENT & PLAN NOTE
On Prozac and Zyprexa  Patient is noncompliant with medications  Will request psychiatric consult  Likely need inpatient psychiatry admission  Patient is medically cleared

## 2019-10-04 NOTE — ASSESSMENT & PLAN NOTE
Likely secondary to poor oral intake  DC IV fluid  Sodium this morning is 133  Stable for discharge to behavior Health Unit

## 2019-10-04 NOTE — PLAN OF CARE
Problem: SAFETY ADULT  Goal: Patient will remain free of falls  Description  INTERVENTIONS:  - Assess patient frequently for physical needs  -  Identify cognitive and physical deficits and behaviors that affect risk of falls    -  [place] fall precautions as indicated by assessment   - Educate patient/family on patient safety including physical limitations  - Instruct patient to call for assistance with activity based on assessment  - Modify environment to reduce risk of injury  - Consider OT/PT consult to assist with strengthening/mobility  Outcome: Progressing  Goal: Maintain or return to baseline ADL function  Description  INTERVENTIONS:  -  Assess patient's ability to carry out ADLs; assess patient's baseline for ADL function and identify physical deficits which impact ability to perform ADLs (bathing, care of mouth/teeth, toileting, grooming, dressing, etc )  - Assess/evaluate cause of self-care deficits   - Assess range of motion  - Assess patient's mobility; develop plan if impaired  - Assess patient's need for assistive devices and provide as appropriate  - Encourage maximum independence but intervene and supervise when necessary  - Involve family in performance of ADLs  - Assess for home care needs following discharge   - Consider OT consult to assist with ADL evaluation and planning for discharge  - Provide patient education as appropriate  Outcome: Progressing  Goal: Maintain or return mobility status to optimal level  Description  INTERVENTIONS:  - Assess patient's baseline mobility status (ambulation, transfers, stairs, etc )    - Identify cognitive and physical deficits and behaviors that affect mobility  - Identify mobility aids required to assist with transfers and/or ambulation (gait belt, sit-to-stand, lift, walker, cane, etc )  - [place] fall precautions as indicated by assessment  - Record patient progress and toleration of activity level on Mobility SBAR; progress patient to next Phase/Stage  - Instruct patient to call for assistance with activity based on assessment  - Consider rehabilitation consult to assist with strengthening/weightbearing, etc   Outcome: Progressing     Problem: DISCHARGE PLANNING  Goal: Discharge to home or other facility with appropriate resources  Description  INTERVENTIONS:  - Identify barriers to discharge w/patient and caregiver  - Arrange for needed discharge resources and transportation as appropriate  - Identify discharge learning needs (meds, wound care, etc )  - Arrange for interpretive services to assist at discharge as needed  - Refer to Case Management Department for coordinating discharge planning if the patient needs post-hospital services based on physician/advanced practitioner order or complex needs related to functional status, cognitive ability, or social support system  Outcome: Progressing     Problem: Knowledge Deficit  Goal: Patient/family/caregiver demonstrates understanding of disease process, treatment plan, medications, and discharge instructions  Description  Complete learning assessment and assess knowledge base    Interventions:  - Provide teaching at level of understanding  - Provide teaching via preferred learning methods  Outcome: Progressing

## 2019-10-04 NOTE — PROGRESS NOTES
Belongings brought in with pt upon arrival:    In locker:  Bag  Headbands/hair ties  Shorts x2  Toiletries  Deodorant   Shirts x3  Leggings x2    In room:  Socks x1  Shirt x3  Conditioner  Shampoo  Face wash  Brush  Leggings x3  Soap

## 2019-10-04 NOTE — SOCIAL WORK
LOS: 1  GMLOS: 2 6  PATIENT IS NOT A MEDICARE BUNDLE OR A 30 DAY READMISSION  Met with patient  Explained role of care management  Patient is a resident of [facility] [address] x 5 years  She is independent adl's and ambulation  Meals, medications and transport provided by facility  Patient states that her mother Prachi Isabel is her  [phone]  Her mother lives in [place]  She does not have an AD and is not interested in information  As per staff at [facility], patient has been refusing to take her medications for the last 2-3 weeks, she has not been eating and has lost weight  They state she has been very reclusive  Patient states that [facility] [address]  She wants to go to a shelter in \[facility] [place] [facility]\""  She states that she will speak with the police if she has to return to [place]  [address]  Patient seem bu psychiatry, Dr Jennings Never  302 initiated as patient continues to refuse to take her medications

## 2019-10-04 NOTE — PLAN OF CARE
Problem: SAFETY ADULT  Goal: Patient will remain free of falls  Description  INTERVENTIONS:  - Assess patient frequently for physical needs  -  Identify cognitive and physical deficits and behaviors that affect risk of falls    -  [place] fall precautions as indicated by assessment   - Educate patient/family on patient safety including physical limitations  - Instruct patient to call for assistance with activity based on assessment  - Modify environment to reduce risk of injury  - Consider OT/PT consult to assist with strengthening/mobility  Outcome: Progressing  Goal: Maintain or return to baseline ADL function  Description  INTERVENTIONS:  -  Assess patient's ability to carry out ADLs; assess patient's baseline for ADL function and identify physical deficits which impact ability to perform ADLs (bathing, care of mouth/teeth, toileting, grooming, dressing, etc )  - Assess/evaluate cause of self-care deficits   - Assess range of motion  - Assess patient's mobility; develop plan if impaired  - Assess patient's need for assistive devices and provide as appropriate  - Encourage maximum independence but intervene and supervise when necessary  - Involve family in performance of ADLs  - Assess for home care needs following discharge   - Consider OT consult to assist with ADL evaluation and planning for discharge  - Provide patient education as appropriate  Outcome: Progressing  Goal: Maintain or return mobility status to optimal level  Description  INTERVENTIONS:  - Assess patient's baseline mobility status (ambulation, transfers, stairs, etc )    - Identify cognitive and physical deficits and behaviors that affect mobility  - Identify mobility aids required to assist with transfers and/or ambulation (gait belt, sit-to-stand, lift, walker, cane, etc )  - [place] fall precautions as indicated by assessment  - Record patient progress and toleration of activity level on Mobility SBAR; progress patient to next Phase/Stage  - Instruct patient to call for assistance with activity based on assessment  - Consider rehabilitation consult to assist with strengthening/weightbearing, etc   Outcome: Progressing     Problem: DISCHARGE PLANNING  Goal: Discharge to home or other facility with appropriate resources  Description  INTERVENTIONS:  - Identify barriers to discharge w/patient and caregiver  - Arrange for needed discharge resources and transportation as appropriate  - Identify discharge learning needs (meds, wound care, etc )  - Arrange for interpretive services to assist at discharge as needed  - Refer to Case Management Department for coordinating discharge planning if the patient needs post-hospital services based on physician/advanced practitioner order or complex needs related to functional status, cognitive ability, or social support system  Outcome: Progressing     Problem: Knowledge Deficit  Goal: Patient/family/caregiver demonstrates understanding of disease process, treatment plan, medications, and discharge instructions  Description  Complete learning assessment and assess knowledge base  Interventions:  - Provide teaching at level of understanding  - Provide teaching via preferred learning methods  Outcome: Progressing     Problem: Potential for Falls  Goal: Patient will remain free of falls  Description  INTERVENTIONS:  - Assess patient frequently for physical needs  -  Identify cognitive and physical deficits and behaviors that affect risk of falls    -  [place] fall precautions as indicated by assessment   - Educate patient/family on patient safety including physical limitations  - Instruct patient to call for assistance with activity based on assessment  - Modify environment to reduce risk of injury  - Consider OT/PT consult to assist with strengthening/mobility  Outcome: Progressing     Problem: Nutrition/Hydration-ADULT  Goal: Nutrient/Hydration intake appropriate for improving, restoring or maintaining nutritional needs  Description  Monitor and assess patient's nutrition/hydration status for malnutrition  Collaborate with interdisciplinary team and initiate plan and interventions as ordered  Monitor patient's weight and dietary intake as ordered or per policy  Utilize nutrition screening tool and intervene as necessary  Determine patient's food preferences and provide high-protein, high-caloric foods as appropriate       INTERVENTIONS:  - Monitor oral intake, urinary output, labs, and treatment plans  - Assess nutrition and hydration status and recommend course of action  - Evaluate amount of meals eaten  - Assist patient with eating if necessary   - Allow adequate time for meals  - Recommend/ encourage appropriate diets, oral nutritional supplements, and vitamin/mineral supplements  - Order, calculate, and assess calorie counts as needed  - Recommend, monitor, and adjust tube feedings and TPN/PPN based on assessed needs  - Assess need for intravenous fluids  - Provide specific nutrition/hydration education as appropriate  - Include patient/family/caregiver in decisions related to nutrition  Outcome: Progressing

## 2019-10-04 NOTE — PROGRESS NOTES
Pt admitted status 302 from [facility] medical unit  Pt states she is here because "I wasn't doing a lot of stuff at home " Denies this was due to depression and said "I just didn't want to " Pt said she hasn't been going to groups, meeting with her psychiatrist, or taking her medications  Pt reports stopping her meds several weeks ago  Pt said she also stopped eating prior to admission  Pt admitted to medical unit for hypokalemia and hyponatremia  Pt said "they put a lot of potassium in me " Pt says her appetite is poor and declined dinner on admission  When asked about food preferences, pt reported she likes fruits, vegetables, eggs, and cereal  Pt then declined those foods when offered  Pt denies SI/HI  Denies depression/anxiety  Pt reports mental health hx but says she disagrees with diagnosis  Pt reports decreased energy and said she has trouble taking care of herself because of this  Denies medical issues  Reports mother Marci Verma is her legal guardian  Denies current substance abuse, states she used THC and LSD "a long time ago " Pt resides at [facility] and says she does not want to return  Pt cooperative on arrival  [facility] to room  Oriented to unit rules/routine  Skin assessment/contraband check completed

## 2019-10-04 NOTE — UTILIZATION REVIEW
Initial Clinical Review    Admission: Date/Time/Statement: Inpatient Admission Orders (From admission, onward)     Ordered        10/03/19 1627  Inpatient Admission (expected length of stay for this patient Order details is greater than two midnights)  Once                   Orders Placed This Encounter   Procedures    Inpatient Admission (expected length of stay for this patient Order details is greater than two midnights)     Standing Status:   Standing     Number of Occurrences:   1     Order Specific Question:   Admitting Physician     Answer:   Sulaiman Nelson [[id number]]     Order Specific Question:   Level of Care     Answer:   Med Surg [[id number]]     Order Specific Question:   Estimated length of stay     Answer:   More than 2 Midnights     Order Specific Question:   Certification     Answer:   I certify that inpatient services are medically necessary for this patient for a duration of greater than two midnights  See H&P and MD Progress Notes for additional information about the patient's course of treatment  ED Arrival Information     Expected Arrival Acuity Means of Arrival Escorted By Service Admission Type    - 10/3/2019 14:27 Emergent Ambulance [facility] [address]  Emergency    Arrival Complaint    psych eval        Chief Complaint   Patient presents with    Psychiatric Evaluation     Patient presents to ED for psychiatric evaulation, pt is coming to ED from [facility] [place] [facility] for refusing medications approx 2-3 weeks, decrease appetite  Per patient she wishes to change facilities  Pt denies SI/HI at time  States she feels safe at facility  Assessment/Plan: 51 y o  female with history of schizophrenia disorder, eating disorder, chronic constipation presented to emergency department for evaluation behavior change from [facility]  Patient reportedly stopped taking all her medication approximately 2-3 weeks ago because she did not like the way the medications made her feel  Patient has not been eating over the past week  Hypokalemia  Assessment & Plan  Will order potassium supplementation both IV and orally  Repeat BMP this evening  Monitor and replete as needed  Monitor on telemetry     Noncompliance with medications  Assessment & Plan  See above     Eating disorder  Assessment & Plan  Patient has history of anorexia  Psychiatry consult     Hyponatremia  Assessment & Plan  Likely secondary to poor oral intake  IV normal saline at 125 cc an hour with potassium supplementation  Monitor labs in a m  Urine lytes and osmolality     Schizophrenia ([address] )  Assessment & Plan  On Prozac and Zyprexa  Patient is noncompliant with medications    Will request psychiatric consult  ED Triage Vitals [10/03/19 1436]   Temperature Pulse Respirations Blood Pressure SpO2   98 2 °F (36 8 °C) 71 19 140/76 98 %      Temp Source Heart Rate Source Patient Position - Orthostatic VS BP Location FiO2 (%)   Tympanic Monitor Lying Right arm --      Pain Score       No Pain        Wt Readings from Last 1 Encounters:   10/04/19 62 7 kg (138 lb 3 7 oz)     Additional Vital Signs:   10/04/19 0845  98 3 °F (36 8 °C)  68  18  120/72  90 %  None (Room air)  Lying   10/03/19 2255  98 3 °F (36 8 °C)  65  16  101/57  97 %  None (Room air)  Lying   10/03/19 1752  97 6 °F (36 4 °C)  65  18  119/78  100 %  None (Room air         Pertinent Labs/Diagnostic Test Results:   10/4/2019 CxR- No acute cardiopulmonary disease    10/4/2019 EKG - Normal sinus rhythm  Normal ECG  No previous ECGs available  Results from last 7 days   Lab Units 10/04/19  0457 10/03/19  1503   WBC Thousand/uL 4 89 7 02   HEMOGLOBIN g/dL 11 6 11 9   HEMATOCRIT % 34 4* 35 8   PLATELETS Thousands/uL 278 347   NEUTROS ABS Thousands/µL 3 41 5 42         Results from last 7 days   Lab Units 10/04/19  0457 10/03/19  2156 10/03/19  1503   SODIUM mmol/L 133* 135* 128*   POTASSIUM mmol/L 4 8 4 4 2 4*   CHLORIDE mmol/L 101 102 89*   CO2 mmol/L 18* 21 22   ANION GAP mmol/L 14* 12 17*   BUN mg/dL 3* 4* 7   CREATININE mg/dL 0 67 0 64 0 85   EGFR ml/min/1 73sq m 104 105 81   CALCIUM mg/dL 8 2* 8 3 9 3   MAGNESIUM mg/dL 1 7  --  1 5*   PHOSPHORUS mg/dL 2 0*  --   --      Results from last 7 days   Lab Units 10/04/19  0457   AST U/L 19   ALT U/L 21   ALK PHOS U/L 47   TOTAL PROTEIN g/dL 6 5   ALBUMIN g/dL 3 4*   TOTAL BILIRUBIN mg/dL 0 60     Results from last 7 days   Lab Units 10/04/19  0457 10/03/19  2156 10/03/19  1503   GLUCOSE RANDOM mg/dL 64* 52* 61*     Results from last 7 days   Lab Units 10/04/19  0457   PROTIME seconds 15 0*   INR  1 21*     Results from last 7 days   Lab Units 10/03/19  1503   TSH 3RD GENERATON uIU/mL 3 879*     Results from last 7 days   Lab Units 10/03/19  1813 10/03/19  1810   CLARITY UA   --  Clear   COLOR UA   --  Straw   SPEC GRAV UA   --  <=1 005   PH UA   --  5 5   GLUCOSE UA mg/dl  --  Negative   KETONES UA mg/dl  --  40 (2+)*   BLOOD UA   --  Negative   PROTEIN UA mg/dl  --  Negative   NITRITE UA   --  Negative   BILIRUBIN UA   --  Negative   UROBILINOGEN UA E U /dl  --  0 2   LEUKOCYTES UA   --  Trace*   WBC UA /hpf  --  1-2*   RBC UA /hpf  --  None Seen   BACTERIA UA /hpf  --  None Seen   EPITHELIAL CELLS WET PREP /hpf  --  None Seen   SODIUM UR  13  --      ED Treatment:   Medication Administration from 10/03/2019 1427 to 10/03/2019 1707       Date/Time Order Dose Route Action Comments     10/03/2019 1617 potassium chloride 20 mEq IVPB (premix) 20 mEq Intravenous New Bag      10/03/2019 1615 potassium chloride (K-DUR,KLOR-CON) CR tablet 40 mEq 40 mEq Oral Given      10/03/2019 1620 sodium chloride 0 9 % bolus 500 mL 500 mL Intravenous New Bag         Past Medical History:   Diagnosis Date    Eating disorder     Schizophrenia ([facility] )      Present on Admission:  **None**    Admitting Diagnosis: Hypokalemia [E87 6]  Schizophrenia ([facility] ) [F20 9]  Noncompliance with medications [Z91 14]  Encounter for psychological evaluation [Z00 8]  Eating disorder, unspecified type [F50 9]  Age/Sex: 51 y o  female  Admission Orders: 10/3/2019  1627 INPATIENT     Current Facility-Administered Medications:  acetaminophen 650 mg Oral Q6H PRN   enoxaparin 40 mg Subcutaneous Daily   FLUoxetine 20 mg Oral Daily   fluticasone 1 puff Inhalation BID   folic acid 1 mg Oral Daily   loratadine 10 mg Oral Daily   OLANZapine 20 mg Oral HS   ondansetron 4 mg Intravenous Q6H PRN   polyethylene glycol 17 g Oral Daily   potassium-sodium phosphateS 1 tablet Oral TID With Meals   potassium chloride (K-DUR,KLOR-CON) CR tablet 40 mEq   Dose: 40 mEq  Freq: Every 4 hours Route: PO  Start: 10/03/19 1730 End: 10/03/19 2216    potassium chloride (K-DUR,KLOR-CON) CR tablet 20 mEq   Dose: 20 mEq  Freq: Once Route: PO  Start: 10/03/19 2230 End: 10/03/19 2234    No prn medication used   [facility] Utilization Review Department  Phone: [phone]; Fax [phone]  [email or web address]  ATTENTION: Please call with any questions or concerns to [phone]  and carefully listen to the prompts so that you are directed to the right person  Send all requests for admission clinical reviews, approved or denied determinations and any other requests to fax [phone]   All voicemails are confidential

## 2019-10-05 LAB
ALBUMIN SERPL BCP-MCNC: 4 G/DL (ref 3.5–5)
ALP SERPL-CCNC: 57 U/L (ref 46–116)
ALT SERPL W P-5'-P-CCNC: 25 U/L (ref 12–78)
ANION GAP SERPL CALCULATED.3IONS-SCNC: 15 MMOL/L (ref 4–13)
AST SERPL W P-5'-P-CCNC: 29 U/L (ref 5–45)
BILIRUB SERPL-MCNC: 0.7 MG/DL (ref 0.2–1)
BUN SERPL-MCNC: 3 MG/DL (ref 5–25)
CALCIUM SERPL-MCNC: 9.5 MG/DL (ref 8.3–10.1)
CHLORIDE SERPL-SCNC: 98 MMOL/L (ref 100–108)
CHOLEST SERPL-MCNC: 192 MG/DL (ref 50–200)
CO2 SERPL-SCNC: 21 MMOL/L (ref 21–32)
CREAT SERPL-MCNC: 0.74 MG/DL (ref 0.6–1.3)
GFR SERPL CREATININE-BSD FRML MDRD: 95 ML/MIN/1.73SQ M
GLUCOSE SERPL-MCNC: 75 MG/DL (ref 65–140)
HCG SERPL QL: NEGATIVE
HDLC SERPL-MCNC: 72 MG/DL (ref 40–60)
LDLC SERPL CALC-MCNC: 100 MG/DL (ref 0–100)
MRSA NOSE QL CULT: NORMAL
NONHDLC SERPL-MCNC: 120 MG/DL
PHOSPHATE SERPL-MCNC: 2.9 MG/DL (ref 2.7–4.5)
POTASSIUM SERPL-SCNC: 4.2 MMOL/L (ref 3.5–5.3)
PROT SERPL-MCNC: 7.5 G/DL (ref 6.4–8.2)
SODIUM SERPL-SCNC: 134 MMOL/L (ref 136–145)
TRIGL SERPL-MCNC: 98 MG/DL
TSH SERPL DL<=0.05 MIU/L-ACNC: 3.29 UIU/ML (ref 0.36–3.74)

## 2019-10-05 PROCEDURE — 99221 1ST HOSP IP/OBS SF/LOW 40: CPT | Performed by: STUDENT IN AN ORGANIZED HEALTH CARE EDUCATION/TRAINING PROGRAM

## 2019-10-05 PROCEDURE — 84443 ASSAY THYROID STIM HORMONE: CPT | Performed by: PSYCHIATRY & NEUROLOGY

## 2019-10-05 PROCEDURE — 80053 COMPREHEN METABOLIC PANEL: CPT | Performed by: PSYCHIATRY & NEUROLOGY

## 2019-10-05 PROCEDURE — 84703 CHORIONIC GONADOTROPIN ASSAY: CPT | Performed by: PSYCHIATRY & NEUROLOGY

## 2019-10-05 PROCEDURE — 80061 LIPID PANEL: CPT | Performed by: PSYCHIATRY & NEUROLOGY

## 2019-10-05 PROCEDURE — 84100 ASSAY OF PHOSPHORUS: CPT | Performed by: INTERNAL MEDICINE

## 2019-10-05 RX ADMIN — OLANZAPINE 20 MG: 10 TABLET, FILM COATED ORAL at 22:16

## 2019-10-05 RX ADMIN — FLUOXETINE 20 MG: 20 CAPSULE ORAL at 08:33
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
## 2019-10-05 NOTE — TREATMENT PLAN
TREATMENT PLAN REVIEW - [address] 51 y o  1970 female MRN: [id number]    [address] [place] [address] Room / Bed: Robel Ross [facility] [id number] Encounter: [id number]          Admit Date/Time:  10/4/2019  3:54 PM    Treatment Team: Attending Provider: Melo Lee; Consulting Physician: Blanca Vivas MD; Charge Nurse: Jerrell Borges, RN; Charge Nurse: Dolores Velasquez, RN; Patient Care Assistant: Shelby Marquez; Patient Care Technician: Natalee De La Cruz; Registered Nurse: Darylene Levers, RN; Registered Nurse: Sisi Velez, DREAD; Patient Care Technician: Kriss Gee    Diagnosis: Principal Problem:    Schizophren[facility]  Active Problems:    Hypokalemia    Hyponatremia    Eating disorder    Patient Strengths/Assets: communication skills, support at group home     Patient Barriers/Limitations: limited family ties, limited insight, limited motivation, patient is on an involuntary commitment, patient is unwilling to work on problems, poor self-care    Short Term Goals: decrease in psychotic symptoms, decrease in frequency of self abusive behaviors, improvement in insight, improvement in self care    Long Term Goals: no self abusive behavior, resolution of psychotic symptoms, improved reasoning ability, improved insight, adequate self care, adequate sleep, adequate appetite    Progress Towards Goals: restarting psychiatric medications as prescribed    Recommended Treatment: medication management, patient medication education, group therapy, milieu therapy, continued Behavioral Health psychiatric evaluation/assessment process     Treatment Frequency: daily medication monitoring, group and milieu therapy daily, monitoring through interdisciplinary rounds, monitoring through weekly patient care conferences    Expected Discharge Date: 5 days - 10/10/2019    Discharge Plan: placement in group home, referral for outpatient medication management with a psychiatrist, referral for outpatient psychotherapy    Treatment Plan Created/Updated By: Luis Miguel Cohn MD

## 2019-10-05 NOTE — CONSULTS
Consultation - Lynda Malin 51 y o  female MRN: [id number]    Unit/Bed#: [facility] 007-27 Encounter: [id number]      Assessment/Plan     Assessment/Plan:  Hypokalemia - secondary to eating disorder  Resolved with PO and IV supplementation  CMP this AM with normal K    [facility] orders per [facility]ry    History of Present Illness     HPI: Lynda Malin is a 51y o  year old female who presents with worsening schizophrenia secondary to medication non compliance  Was admitted 10/3-10/4 medically due to hypokalemia, secondary to eating disorder  This has resolved  Has no medical complaints at this time  Inpatient consult for Medical Clearance for [facility] patient  Consult performed by: Beau Claudio PA-C  Consult ordered by: Lance Rios          Review of Systems   Constitutional: Negative for chills, diaphoresis and fever  Eyes: Negative for visual disturbance  Respiratory: Negative for cough and shortness of breath  Cardiovascular: Negative for chest pain and palpitations  Gastrointestinal: Negative for abdominal pain, diarrhea, nausea and vomiting  Genitourinary: Negative for dysuria, flank pain and frequency  Musculoskeletal: Negative for arthralgias and myalgias  Skin: Negative for color change, rash and wound  Allergic/Immunologic: Negative for immunocompromised state  Neurological: Negative for dizziness and light-headedness  Hematological: Does not bruise/bleed easily  Psychiatric/Behavioral: Positive for behavioral problems (medically non compliant)  Negative for confusion and suicidal ideas  The patient is not nervous/anxious          Historical Information   Past Medical History:   Diagnosis Date    Eating disorder     Schizophrenia [facility])      Past Surgical History:   Procedure Laterality Date    CHOLECYSTECTOMY      DILATION AND CURETTAGE OF UTERUS       Social History   Social History     Substance and Sexual Activity   Alcohol Use No     Social History     Substance and Sexual Activity   Drug Use No     Social History     Tobacco Use   Smoking Status Never Smoker   Smokeless Tobacco Never Used     Family History: non-contributory    Meds/Allergies   current meds:   Current Facility-Administered Medications   Medication Dose Route Frequency    acetaminophen (TYLENOL) tablet 650 mg  650 mg Oral Q6H PRN    acetaminophen (TYLENOL) tablet 650 mg  650 mg Oral Q6H PRN    aluminum-magnesium hydroxide-simethicone (MYLANTA) 200-200-20 mg/5 mL oral suspension 30 mL  30 mL Oral Q4H PRN    benztropine (COGENTIN) injection 1 mg  1 mg Intramuscular Q6H PRN    benztropine (COGENTIN) tablet 1 mg  1 mg Oral Q6H PRN    FLUoxetine (PROzac) capsule 20 mg  20 mg Oral Daily    fluticasone (FLOVENT HFA) 110 MCG/ACT inhaler 1 puff  1 puff Inhalation BID    folic acid (FOLVITE) tablet 1 mg  1 mg Oral Daily    loratadine (CLARITIN) tablet 10 mg  10 mg Oral Daily    LORazepam (ATIVAN) 2 mg/mL injection 2 mg  2 mg Intramuscular Q6H PRN    LORazepam (ATIVAN) tablet 1 mg  1 mg Oral Q8H PRN    magnesium hydroxide (MILK OF MAGNESIA) 400 mg/5 mL oral suspension 30 mL  30 mL Oral Daily PRN    OLANZapine (ZyPREXA) IM injection 5 mg  5 mg Intramuscular Q8H PRN    OLANZapine (ZyPREXA) tablet 20 mg  20 mg Oral HS    OLANZapine (ZyPREXA) tablet 7 5 mg  7 5 mg Oral Q8H PRN    ondansetron (ZOFRAN) injection 4 mg  4 mg Intravenous Q6H PRN    polyethylene glycol (MIRALAX) packet 17 g  17 g Oral Daily    potassium-sodium phosphateS (K-PHOS,PHOSPHA 250) -250 mg tablet 1 tablet  1 tablet Oral TID With Meals    traZODone (DESYREL) tablet 50 mg  50 mg Oral HS PRN     Allergies   Allergen Reactions    Haloperidol      Other reaction(s): Unknown Reaction    Sulfa Antibiotics        Objective   Vitals: Blood pressure 99/65, pulse 65, temperature (!) 96 5 °F (35 8 °C), temperature source Tympanic, resp  rate 16, height 5' 5" (1 651 m), weight 62 6 kg (138 lb)      Intake/Output Summary (Last 24 hours) at 10/5/2019 0911  Last data filed at 10/5/2019 0459  Gross per 24 hour   Intake    Output 0 ml   Net 0 ml     Invasive Devices     None                 Physical Exam   Constitutional: She is oriented to person, place, and time  No distress  Generally thin   HENT:   Head: Normocephalic and atraumatic  Mouth/Throat: Oropharynx is clear and moist    Eyes: Pupils are equal, round, and reactive to light  No scleral icterus  Neck: No JVD present  Cardiovascular: Normal rate and regular rhythm  Exam reveals no gallop and no friction rub  No murmur heard  Pulmonary/Chest: No respiratory distress  She has no wheezes  She has no rales  Abdominal: Soft  Bowel sounds are normal  She exhibits no distension and no mass  There is no tenderness  There is no rebound and no guarding  Musculoskeletal: She exhibits no edema  Neurological: She is alert and oriented to person, place, and time  Skin: Skin is warm and dry  Capillary refill takes less than 2 seconds  She is not diaphoretic  No pallor  Psychiatric: She has a normal mood and affect  Her behavior is normal    Vitals reviewed  Lab Results: I have personally reviewed pertinent reports  Imaging Studies: I have personally reviewed pertinent reports  EKG, Pathology, and Other Studies: I have personally reviewed pertinent reports      VTE Prophylaxis: Reason for no pharmacologic prophylaxis Pt ambulatory    Code Status: Level 1 - Full Code  Advance Directive and Living Will:      Power of :    POLST:

## 2019-10-05 NOTE — PLAN OF CARE
Problem: SELF CARE DEFICIT  Goal: Return ADL status to a safe level of function  Description  INTERVENTIONS:  - Administer medication as ordered  - Assess ADL deficits and provide assistive devices as needed  - Obtain PT/OT consults as needed  - Assist and instruct patient to increase activity and self care as tolerated  Outcome: Progressing     Problem: INVOLUNTARY ADMIT  Goal: Will cooperate with staff recommendations and doctor's orders and will demonstrate appropriate behavior  Description  INTERVENTIONS:  - Treat underlying conditions and offer medication as ordered  - Educate regarding involuntary admission procedures and rules  - Utilize positive consistent limit setting strategies to support patient and staff safety  Outcome: Not Progressing     Problem: Nutrition/Hydration-ADULT  Goal: Nutrient/Hydration intake appropriate for improving, restoring or maintaining nutritional needs  Description  Monitor and assess patient's nutrition/hydration status for malnutrition  Collaborate with interdisciplinary team and initiate plan and interventions as ordered  Monitor patient's weight and dietary intake as ordered or per policy  Utilize nutrition screening tool and intervene as necessary  Determine patient's food preferences and provide high-protein, high-caloric foods as appropriate       INTERVENTIONS:  - Monitor oral intake, urinary output, labs, and treatment plans  - Assess nutrition and hydration status and recommend course of action  - Evaluate amount of meals eaten  - Assist patient with eating if necessary   - Allow adequate time for meals  - Recommend/ encourage appropriate diets, oral nutritional supplements, and vitamin/mineral supplements  - Order, calculate, and assess calorie counts as needed  - Recommend, monitor, and adjust tube feedings and TPN/PPN based on assessed needs  - Assess need for intravenous fluids  - Provide specific nutrition/hydration education as appropriate  - Include patient/family/caregiver in decisions related to nutrition  Outcome: Not Progressing

## 2019-10-05 NOTE — PROGRESS NOTES
Pt refused to eat breakfast and lunch despite much encouragement and education of potential health complications from malnutrition  Pt only agreeable to drink water  Per staff report, pt did not eat anything yesterday  Fasting glucose with labwork early this AM resulted 75  Pt refusing to have accucheck done   Will notify MD

## 2019-10-05 NOTE — PROGRESS NOTES
AM rounds    302 from [address] for hypokalemia and hyponatremia due to pt refusing to eat or drink  Off meds for weeks  Mom is legal guardian  Pt resides at [facility] and does not want to return  Hx THC and LSD  UDS nagative  Pt on I&Os

## 2019-10-05 NOTE — PROGRESS NOTES
Med Note: Pt refused potassium stating, " I don't need it or wanted " Attempted to educate pt regarding low potassium level; pt  Stated, "I'm not taking it " Will make MD aware

## 2019-10-05 NOTE — PROGRESS NOTES
Pt dizzy this morning when getting out of bed requiring staff assist to chair  BP 99/65  Pt continues to denies food or any beverage other than plain water

## 2019-10-05 NOTE — CONSULTS
Pt is amenable to Ensure enlive QID  Encouraged po intake for optimal health of GI tract  Pt states "I will try"

## 2019-10-06 RX ORDER — OLANZAPINE 10 MG/1
10 TABLET, ORALLY DISINTEGRATING ORAL 2 TIMES DAILY
Status: DISCONTINUED | OUTPATIENT
Start: 2019-10-06 | End: 2019-10-08

## 2019-10-06 RX ADMIN — OLANZAPINE 10 MG: 10 TABLET, ORALLY DISINTEGRATING ORAL at 17:12

## 2019-10-06 RX ADMIN — OLANZAPINE 10 MG: 10 TABLET, ORALLY DISINTEGRATING ORAL at 10:29

## 2019-10-06 RX ADMIN — FLUOXETINE 20 MG: 20 CAPSULE ORAL at 09:28

## 2019-10-06 NOTE — PLAN OF CARE
Problem: INVOLUNTARY ADMIT  Goal: Will cooperate with staff recommendations and doctor's orders and will demonstrate appropriate behavior  Description  INTERVENTIONS:  - Treat underlying conditions and offer medication as ordered  - Educate regarding involuntary admission procedures and rules  - Utilize positive consistent limit setting strategies to support patient and staff safety  Outcome: Progressing     Problem: SELF CARE DEFICIT  Goal: Return ADL status to a safe level of function  Description  INTERVENTIONS:  - Administer medication as ordered  - Assess ADL deficits and provide assistive devices as needed  - Obtain PT/OT consults as needed  - Assist and instruct patient to increase activity and self care as tolerated  Outcome: Progressing     Problem: Nutrition/Hydration-ADULT  Goal: Nutrient/Hydration intake appropriate for improving, restoring or maintaining nutritional needs  Description  Monitor and assess patient's nutrition/hydration status for malnutrition  Collaborate with interdisciplinary team and initiate plan and interventions as ordered  Monitor patient's weight and dietary intake as ordered or per policy  Utilize nutrition screening tool and intervene as necessary  Determine patient's food preferences and provide high-protein, high-caloric foods as appropriate       INTERVENTIONS:  - Monitor oral intake, urinary output, labs, and treatment plans  - Assess nutrition and hydration status and recommend course of action  - Evaluate amount of meals eaten  - Assist patient with eating if necessary   - Allow adequate time for meals  - Recommend/ encourage appropriate diets, oral nutritional supplements, and vitamin/mineral supplements  - Order, calculate, and assess calorie counts as needed  - Recommend, monitor, and adjust tube feedings and TPN/PPN based on assessed needs  - Assess need for intravenous fluids  - Provide specific nutrition/hydration education as appropriate  - Include patient/family/caregiver in decisions related to nutrition  Outcome: Progressing  Goal: Nutrient/Hydration intake appropriate for improving, restoring or maintaining nutritional needs  Description  Monitor and assess patient's nutrition/hydration status for malnutrition  Collaborate with interdisciplinary team and initiate plan and interventions as ordered  Monitor patient's weight and dietary intake as ordered or per policy  Utilize nutrition screening tool and intervene as necessary  Determine patient's food preferences and provide high-protein, high-caloric foods as appropriate       INTERVENTIONS:  - Monitor oral intake, urinary output, labs, and treatment plans  - Assess nutrition and hydration status and recommend course of action  - Evaluate amount of meals eaten  - Assist patient with eating if necessary   - Allow adequate time for meals  - Recommend/ encourage appropriate diets, oral nutritional supplements, and vitamin/mineral supplements  - Order, calculate, and assess calorie counts as needed  - Recommend, monitor, and adjust tube feedings and TPN/PPN based on assessed needs  - Assess need for intravenous fluids  - Provide specific nutrition/hydration education as appropriate  - Include patient/family/caregiver in decisions related to nutrition  Outcome: Progressing     Problem: Alteration in Thoughts and Perception  Goal: Verbalize thoughts and feelings  Description  Interventions:  - Promote a nonjudgmental and trusting relationship with the patient through active listening and therapeutic communication  - Assess patient's level of functioning, behavior and potential for risk  - Engage patient in 1 on 1 interactions  - Encourage patient to express fears, feelings, frustrations, and discuss symptoms    - [place] patient to reality, help patient recognize reality-based thinking   - Administer medications as ordered and assess for potential side effects  - Provide the patient education related to the signs and symptoms of the illness and desired effects of prescribed medications  Outcome: Progressing  Goal: Refrain from acting on delusional thinking/internal stimuli  Description  Interventions:  - Monitor patient closely, per order   - Utilize least restrictive measures   - Set reasonable limits, give positive feedback for acceptable   - Administer medications as ordered and monitor of potential side effects  Outcome: Progressing  Goal: Agree to be compliant with medication regime, as prescribed and report medication side effects  Description  Interventions:  - Offer appropriate PRN medication and supervise ingestion; conduct AIMS, as needed   Outcome: Progressing     Problem: Risk for Self Injury/Neglect  Goal: Refrain from harming self  Description  Interventions:  - Monitor patient closely, per order  - Develop a trusting relationship  - Supervise medication ingestion, monitor effects and side effects   Outcome: Progressing  Goal: Complete daily ADLs, including personal hygiene independently, as able  Description  Interventions:  - Observe, teach, and assist patient with ADLS  - Monitor and promote a balance of rest/activity, with adequate nutrition and elimination  Outcome: Progressing

## 2019-10-06 NOTE — PLAN OF CARE
Problem: INVOLUNTARY ADMIT  Goal: Will cooperate with staff recommendations and doctor's orders and will demonstrate appropriate behavior  Description  INTERVENTIONS:  - Treat underlying conditions and offer medication as ordered  - Educate regarding involuntary admission procedures and rules  - Utilize positive consistent limit setting strategies to support patient and staff safety  Outcome: Progressing     Problem: SELF CARE DEFICIT  Goal: Return ADL status to a safe level of function  Description  INTERVENTIONS:  - Administer medication as ordered  - Assess ADL deficits and provide assistive devices as needed  - Obtain PT/OT consults as needed  - Assist and instruct patient to increase activity and self care as tolerated  Outcome: Progressing     Problem: Nutrition/Hydration-ADULT  Goal: Nutrient/Hydration intake appropriate for improving, restoring or maintaining nutritional needs  Description  Monitor and assess patient's nutrition/hydration status for malnutrition  Collaborate with interdisciplinary team and initiate plan and interventions as ordered  Monitor patient's weight and dietary intake as ordered or per policy  Utilize nutrition screening tool and intervene as necessary  Determine patient's food preferences and provide high-protein, high-caloric foods as appropriate       INTERVENTIONS:  - Monitor oral intake, urinary output, labs, and treatment plans  - Assess nutrition and hydration status and recommend course of action  - Evaluate amount of meals eaten  - Assist patient with eating if necessary   - Allow adequate time for meals  - Recommend/ encourage appropriate diets, oral nutritional supplements, and vitamin/mineral supplements  - Order, calculate, and assess calorie counts as needed  - Recommend, monitor, and adjust tube feedings and TPN/PPN based on assessed needs  - Assess need for intravenous fluids  - Provide specific nutrition/hydration education as appropriate  - Include patient/family/caregiver in decisions related to nutrition  Outcome: Progressing

## 2019-10-06 NOTE — PROGRESS NOTES
Pt refuses vital signs check and lab work today  Pt states "I just don't like doing that staff  I've done enough on the medical side"  Pt is irritable when being asked questions  Pt is accepting of Ensure today and states she has eaten some tracee crackers today  Pt remains seclusive to self in room  Appears preoccupied

## 2019-10-06 NOTE — PROGRESS NOTES
Progress Note - Behavioral Health   Kavitha Lo 51 y o  female MRN: [id number]  Unit/Bed#: [facility] 305-82 Encounter: [id number]    Subjective:    Per nursing, patient refused to eat anything during the day yesterday  She has agreed to drink Ensure supplements, has been drinking water as well  Patient has been refusing vital signs, potassium supplementation, refusing to have labs drawn this morning or Accucheck  Per patient, patient reports that she doesn't want to return to her group home, wants to go to a long-term hospital or homeless shelter  She reports that she doesn't like the staff where she lives  She reports sleeping okay last night  She reports that she has been drinking Ensures, eating animal crackers and tracee crackers  When asked about refusal of vital signs, patient reports "I know my vital signs are fine, my BP is usually 90/60 "  Discussed concerns since she was not eating or drinking enough, patient reports that she feels fine  When asked about refusal of Potassium supplements and blood work, patient reports "I know my potassium is fine, I'm having Ensure which has potassium "  Discussed importance of vital signs and blood work to make sure she is healthy but patient reports feeling forced to do things she doesn't want to  Patient denies any auditory or visual hallucinations  Patient has been compliant with psychiatric medications but no other medications or supplements  Behavior over the last 24 hours:  unchanged  Medication side effects: No  ROS: no complaints    Objective:    Refused vitals      Mental Status Evaluation:  Appearance:  Sitting comfortably on bed, dressed in casual clothing, no apparent distress, limited cooperativity with interview   Behavior:  No tics, tremors, or behaviors observed   Speech:  Normal rate, rhythm, and volume   Mood:  "I don't want to talk about it"   Affect:  Irritable edge, constricted in depressed range, stable   Thought Process:  Paucity of thoughts and Illogical   Associations intact associations   Thought Content:  No passive or active suicidal or homicidal ideation, intent, or plan  Paranoid delusions suspected   Perceptual Disturbances: Denies any auditory or visual hallucinations and Appears internally preoccupied   Sensorium:  Oriented to person, place, time, and situation   Memory:  recent and remote memory grossly intact   Consciousness:  alert and awake   Attention: distractible   Insight:  poor   Judgment: impaired   Gait/Station: normal gait/station   Motor Activity: no abnormal movements       Labs: I have personally reviewed all pertinent laboratory/tests results  Labs in last 72 hours:   Recent Labs     10/04/19  0457 10/05/19  0625   WBC 4 89  --    RBC 3 83  --    HGB 11 6  --    HCT 34 4*  --      --    RDW 13 6  --    NEUTROABS 3 41  --    SODIUM 133* 134*   K 4 8 4 2    98*   CO2 18* 21   BUN 3* 3*   CREATININE 0 67 0 74   GLUC 64* 75   CALCIUM 8 2* 9 5   AST 19 29   ALT 21 25   ALKPHOS 47 57   TP 6 5 7 5   ALB 3 4* 4 0   TBILI 0 60 0 70   CHOLESTEROL  --  192   HDL  --  72*   TRIG  --  98   LDLCALC  --  100   [id number]  --  3 291   PREGSERUM  --  Negative       Progress Toward Goals: Progressing    Recommended Treatment: Continue with group therapy, milieu therapy and occupational therapy  Risks, benefits and possible side effects of Medications:   Risks, benefits, and possible side effects of medications explained to patient and patient verbalizes understanding  Medications: all current active meds have been reviewed      Current Facility-Administered Medications:  acetaminophen 650 mg Oral Q6H PRN Marilee Eid MD   acetaminophen 650 mg Oral Q6H PRN Marilee Eid MD   aluminum-magnesium hydroxide-simethicone 30 mL Oral Q4H PRN Marilee Eid MD   benztropine 1 mg Intramuscular Q6H PRN Marilee Eid MD   benztropine 1 mg Oral Q6H PRN Marilee Eid MD   FLUoxetine 20 mg Oral Daily Marilee Eid MD   folic acid 1 mg Oral Daily Jeremy Morgan MD   LORazepam 2 mg Intramuscular Q6H PRN Jeremy Morgan MD   LORazepam 1 mg Oral Q8H PRN Jeremy Morgan MD   magnesium hydroxide 30 mL Oral Daily PRN Jeremy Morgan MD   OLANZapine 10 mg Oral BID Jeremy Morgan MD   OLANZapine 5 mg Intramuscular Q8H PRN Jeremy Morgan MD   OLANZapine 7 5 mg Oral Q8H PRN Jeremy Morgan MD   ondansetron 4 mg Intravenous Q6H PRN Jeremy Morgan MD   traZODone 50 mg Oral HS PRN Jeremy Morgan MD       Assessment/Plan   Principal Problem:    Schizophrenia [facility])  Active Problems:    Hypokalemia    Hyponatremia    Eating disorder    52 y/o Female with schizophrenia, eating disorder, electrolyte abnormalities- some increased po intake with patient agreeable to drinking Ensure shakes and animal crackers, drinking fluids, continues to refuse vitals and bloodwork as well as potassium supplementation, continues to appear internally preoccupied and isolative on unit  Plan:  -Continue to closely monitor I/O  -Encourage po intake   -Will attempt bloodwork again tomorrow   -Split Zyprexa dosing to 10 mg bid to help avoid orthostasis  -Continue rest of med regimen

## 2019-10-06 NOTE — PROGRESS NOTES
Patient went to room early resting  For an hour then received night meds zyprexa  She slept for a few hours waking up at 0100 thinking it was 0800  She was redirected back to bed falling asleep within the hour and remained asleep throughout the night

## 2019-10-06 NOTE — PROGRESS NOTES
Daily Rounds:    Dietary consult completed yesterday  Pt agreeable to drink Ensure and has been drinking water  Refusing vital signs and am labs  Will split Zyprexa dose to am and pm to minimize risk of low BP

## 2019-10-06 NOTE — PROGRESS NOTES
Pt seclusive to room, sitting in bed  Asked pt how she is doing and she replied, "I don't want to talk about it " Says she has been talking to people throughout the day and does not wish to talk with writer currently  Pt did drink Ensure with each meal today and reports eating [place]

## 2019-10-07 PROCEDURE — 99231 SBSQ HOSP IP/OBS SF/LOW 25: CPT

## 2019-10-07 RX ADMIN — OLANZAPINE 10 MG: 10 TABLET, ORALLY DISINTEGRATING ORAL at 08:23

## 2019-10-07 RX ADMIN — FLUOXETINE 20 MG: 20 CAPSULE ORAL at 08:23

## 2019-10-07 RX ADMIN — OLANZAPINE 10 MG: 10 TABLET, ORALLY DISINTEGRATING ORAL at 17:32

## 2019-10-07 NOTE — PROGRESS NOTES
Pt appears to have slept much of the night, waking at 0445  Pt was observed filling cup using bathroom sink, unknown amount consumed overnight  Pt excused self from talking with writer  Observed sitting upright in bed, RIS   Pt refused labs again this morning, "I don't like that!"
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
## 2019-10-07 NOTE — PROGRESS NOTES
Pt remains seclusive to room  Scant in conversation  Slightly less irritable this evening  Pt said she feels sleepy  Denies any other issues  Pt has been observed sitting in bed throughout day  Does not attend groups or interact with peers  Compliant with medicines  Pt walked to dinner and took her ensure from dinner tray  Pt also has been eating hermes grahams and animal crackers

## 2019-10-07 NOTE — PROGRESS NOTES
Progress Note - Behavioral Health   Halley Song 51 y o  female MRN: [id number]  Unit/Bed#: [facility] 254-02 Encounter: [id number]     Principal Problem:    Schizophrenia ([address] )  Active Problems:    Hypokalemia    Hyponatremia    Eating disorder    Subjective: Patient seen and evaluated this morning while she was sitting in bed  Patient reports her mood is "fine," but is irritable with this writer  Patient denies suicidal ideation, homicidal ideation, auditory and visual hallucinations  Patient continues to endorse decreased appetite and becomes irritable with this writer when potential history of eating disorder is discussed  Patient is fixated on discharge "to a shelter" and appears to have poor insight, also refusing to return to [facility]  Patient repeatedly expresses irritation that staff are "forcing me to do things [ie , lab work, vital signs, etc ] that I don't want to do " Patient educated on importance of lab work given recent electrolyte abnormalities and expressed understanding of need for lab work, but still refused  Patient denies any side effects to medication  Patient informed that she will see a mental health  tomorrow who will hear from both treatment team and from patient to decide patient's length of stay  Patient expressed understanding  Behavior over the last 24 hours: Per staff and RN notes, patient has been seclusive to room and irritable with occasional RIS  Patient has been refusing labs and vital signs  Patient has been compliant with her scheduled psychiatric medications  Sleep: Patient reports adequate sleep of "a few hours" last night    Appetite: Patient reports continued decreased appetite but has been drinking Ensure up to 3 times per day and has been evaluated by Nutrition      Medication side effects: Patient denies stiffness in neck or extremities, denies symptoms of restless legs/restlessness, denies GI upset (no nausea, vomiting, constipation, or diarrhea), denies headache, denies over-sedation, and denies any pain or other side effects  ROS: No complaints on ROS  Mental Status Evaluation:  Appearance:  casually dressed and younger than stated age   Behavior:  guarded   Speech:  increased latency of response and loud   Mood:  "fine"   Affect:  labile and mood-congruent, decreased range   Thought Process:  circumstantial and perserverative about discharge to shelter   Thought Content:  obsessions   Perceptual Disturbances: None   Risk Potential: Denies suicidal ideation, homicidal ideation; no potential for aggression   Sensorium:  person, place and year   Cognition:  grossly intact   Consciousness:  alert    Attention: shorter attention span than expected   Insight:  limited   Judgment: limited   Gait/Station: Patient evaluated while sitting in bed   Motor Activity: no abnormal movements     Progress Toward Goals:   Patient has been compliant with olanzapine 10mg BID and fluoxetine 20mg, but continues to refuse lab work and vital signs  Patient states that she understands why physicians are ordering labs (concern for electrolyte abnormalities and potential for conduction abnormalities) and still adamantly refuses  Recommended Treatment:   - Continue fluoxetine 20mg daily for management of patient's depression and anxiety  - Continue olanzapine 10mg BID for mood stabilization   - Follow up with 303 and court decision tomorrow 10/8/19  Will plan disposition accordingly   - Patient continues to endorse wanting discharge to a shelter  Continue open discussion with patient about other options, including returning to [facility]   - Continue to monitor patient for side effects of medication   - Follow-up with lab work results tomorrow if patient allows them to be drawn  Continue discussing lab work with patient if continues to refuse  - Continue with group therapy, milieu therapy and occupational therapy        Risks, benefits and possible side effects of Medications: Risks, benefits, and possible side effects of medications explained to patient and patient verbalizes understanding  Risks of medications in pregnancy explained in this female patient  Patient verbalizes understanding and agrees to notify her physician if she becomes pregnant  Labs:   Patient continues to refuse lab work and vital signs  CMP ordered for tomorrow 10/8/19 in the morning in the setting of recent hyponatremia and hypokalemia on admission (likely that patient will refuse labs)  Previous labs reviewed  Most recent CMP on 10/5/19 showed patient's Na+ is 134 (increased from 128 on 10/3/19 admission) and K+ is 4 2 (increased from 2 4 on 10/3/19 admission)  Other lab work reviewed (TSH, phosphorus, serum hCG, magnesium, etc ) and mostly within normal limits  Commitment Status:  Patient currently admitted on a 36 petitioned by Dennis rangel for danger to self on 10/4/19  303 to be filled out by attending physician today and patient to be evaluated on unit tomorrow by mental health court

## 2019-10-07 NOTE — PROGRESS NOTES
10/07/19 1511   Team Meeting   Meeting Type Tx Team Meeting   Team Members Present   Team Members Present Physician;Nurse;   Physician Team Member Dr Lorrie Mccormick Team Member NAHID [facility] Management Team Member Mark   Patient/Family Present   Patient Present Yes   Patient's Family Present No     TX team discussed plan for pt's admission including return to [facility] at time of dc  Dr Angela Mcleod requested that pt cooperate with lab work in order to help with pt's medications  TX discussed pt having a guardian (mother) and her recommendations will have to be taken into account when planning for dc

## 2019-10-07 NOTE — PROGRESS NOTES
Per report from previous shift:  Discussed reason for admit, hx: anorexia, seen by dietician Saturday, refuses to eat, drinks ensure X3, refusing VS, labs, not taking potassium MD dc'd, seclusive, appears preoccupied, irritable at times, does not want to talk  RN: refused AM VS

## 2019-10-07 NOTE — CASE MANAGEMENT
CM met with pt to discuss treatment goals, reasons for admission and dc planning  Pt reports that she has not been taking her medication for several weeks  Pt is a resident at [facility] in [place], [place]  Pt reports that she does not like it there and she wants a change  Pt is and angry and irritable  Pt does identify that she has a legal guardian and reluctantly states that it is her motherKenton Carranza ([phone] ) Pt signed a CARLTON for this writer to be in contact with her mother  Pt denies drug and alcohol use  Denies legal hx  Pt states she received SSI  Pt reports that she sees Dr Ac Navarro for medication management but does not see a therapist      [address] [address] team recommendation is that pt go back to [facility] at time of dc with appropriate medications  Pt signed CARLTON for Mother     Pt is advised of her rights and hearing is scheduled for 10/8/19  CM faxed 302 and 303 to [facility] "[employer] @ [employer] is aware of need for hearing  CM will continue to follow

## 2019-10-07 NOTE — PROGRESS NOTES
Pt seclusive to room  Irritable edge  Pt told writer she does not want to talk and was minimally cooperative with answering questions  Became more irritable when asked about SI  Pt denied SI  When pointed out that pt seemed upset by the question and reassured that staff is here to help, pt apologized  Pt refused AM vitals  Agrees to drink ensure when breakfast arrives but does not want food  Pt said she received her scheduled meds  When asked if pt has any questions/issues with medications, pt stated "I don't know  I don't want to talk about it " Pt agrees to come to staff if having any concerns

## 2019-10-08 RX ORDER — OLANZAPINE 10 MG/1
10 TABLET, ORALLY DISINTEGRATING ORAL DAILY
Status: DISCONTINUED | OUTPATIENT
Start: 2019-10-09 | End: 2019-10-14 | Stop reason: HOSPADM

## 2019-10-08 RX ADMIN — OLANZAPINE 10 MG: 10 TABLET, ORALLY DISINTEGRATING ORAL at 08:31

## 2019-10-08 RX ADMIN — OLANZAPINE 15 MG: 10 TABLET, ORALLY DISINTEGRATING ORAL at 17:55

## 2019-10-08 RX ADMIN — FLUOXETINE 20 MG: 20 CAPSULE ORAL at 08:31

## 2019-10-08 NOTE — PLAN OF CARE
Pt is now on a 303 commitment and received 10 days of additional treatment  Pt is able to return to [facility] with medications managed by Dr Yonis Jennings @ [address] [id number]  Dc date yet to be determined  CM has been in contact with pt's mother who is also the legal guardian to advise of all updates in regards to pt's care

## 2019-10-08 NOTE — PLAN OF CARE
Problem: INVOLUNTARY ADMIT  Goal: Will cooperate with staff recommendations and doctor's orders and will demonstrate appropriate behavior  Description  INTERVENTIONS:  - Treat underlying conditions and offer medication as ordered  - Educate regarding involuntary admission procedures and rules  - Utilize positive consistent limit setting strategies to support patient and staff safety  Outcome: Progressing     Problem: SELF CARE DEFICIT  Goal: Return ADL status to a safe level of function  Description  INTERVENTIONS:  - Administer medication as ordered  - Assess ADL deficits and provide assistive devices as needed  - Obtain PT/OT consults as needed  - Assist and instruct patient to increase activity and self care as tolerated  Outcome: Progressing     Problem: Nutrition/Hydration-ADULT  Goal: Nutrient/Hydration intake appropriate for improving, restoring or maintaining nutritional needs  Description  Monitor and assess patient's nutrition/hydration status for malnutrition  Collaborate with interdisciplinary team and initiate plan and interventions as ordered  Monitor patient's weight and dietary intake as ordered or per policy  Utilize nutrition screening tool and intervene as necessary  Determine patient's food preferences and provide high-protein, high-caloric foods as appropriate       INTERVENTIONS:  - Monitor oral intake, urinary output, labs, and treatment plans  - Assess nutrition and hydration status and recommend course of action  - Evaluate amount of meals eaten  - Assist patient with eating if necessary   - Allow adequate time for meals  - Recommend/ encourage appropriate diets, oral nutritional supplements, and vitamin/mineral supplements  - Order, calculate, and assess calorie counts as needed  - Recommend, monitor, and adjust tube feedings and TPN/PPN based on assessed needs  - Assess need for intravenous fluids  - Provide specific nutrition/hydration education as appropriate  - Include patient/family/caregiver in decisions related to nutrition  Outcome: Progressing  Goal: Nutrient/Hydration intake appropriate for improving, restoring or maintaining nutritional needs  Description  Monitor and assess patient's nutrition/hydration status for malnutrition  Collaborate with interdisciplinary team and initiate plan and interventions as ordered  Monitor patient's weight and dietary intake as ordered or per policy  Utilize nutrition screening tool and intervene as necessary  Determine patient's food preferences and provide high-protein, high-caloric foods as appropriate       INTERVENTIONS:  - Monitor oral intake, urinary output, labs, and treatment plans  - Assess nutrition and hydration status and recommend course of action  - Evaluate amount of meals eaten  - Assist patient with eating if necessary   - Allow adequate time for meals  - Recommend/ encourage appropriate diets, oral nutritional supplements, and vitamin/mineral supplements  - Order, calculate, and assess calorie counts as needed  - Recommend, monitor, and adjust tube feedings and TPN/PPN based on assessed needs  - Assess need for intravenous fluids  - Provide specific nutrition/hydration education as appropriate  - Include patient/family/caregiver in decisions related to nutrition  Outcome: Progressing     Problem: Alteration in Thoughts and Perception  Goal: Verbalize thoughts and feelings  Description  Interventions:  - Promote a nonjudgmental and trusting relationship with the patient through active listening and therapeutic communication  - Assess patient's level of functioning, behavior and potential for risk  - Engage patient in 1 on 1 interactions  - Encourage patient to express fears, feelings, frustrations, and discuss symptoms    - [place] patient to reality, help patient recognize reality-based thinking   - Administer medications as ordered and assess for potential side effects  - Provide the patient education related to the signs and symptoms of the illness and desired effects of prescribed medications  Outcome: Progressing  Goal: Refrain from acting on delusional thinking/internal stimuli  Description  Interventions:  - Monitor patient closely, per order   - Utilize least restrictive measures   - Set reasonable limits, give positive feedback for acceptable   - Administer medications as ordered and monitor of potential side effects  Outcome: Progressing  Goal: Agree to be compliant with medication regime, as prescribed and report medication side effects  Description  Interventions:  - Offer appropriate PRN medication and supervise ingestion; conduct AIMS, as needed   Outcome: Progressing     Problem: Risk for Self Injury/Neglect  Goal: Refrain from harming self  Description  Interventions:  - Monitor patient closely, per order  - Develop a trusting relationship  - Supervise medication ingestion, monitor effects and side effects   Outcome: Progressing  Goal: Complete daily ADLs, including personal hygiene independently, as able  Description  Interventions:  - Observe, teach, and assist patient with ADLS  - Monitor and promote a balance of rest/activity, with adequate nutrition and elimination  Outcome: Progressing     Problem: DISCHARGE PLANNING  Goal: Discharge to home or other facility with appropriate resources  Description  INTERVENTIONS:  - Identify barriers to discharge w/patient and caregiver  - Arrange for needed discharge resources and transportation as appropriate  - Identify discharge learning needs (meds, wound care, etc )  - Arrange for interpretive services to assist at discharge as needed  - Refer to Case Management Department for coordinating discharge planning if the patient needs post-hospital services based on physician/advanced practitioner order or complex needs related to functional status, cognitive ability, or social support system  Outcome: Progressing     Problem: Ineffective Coping  Goal: Participates in unit activities  Description  Interventions:  - Provide therapeutic environment   - Provide required programming   - Redirect inappropriate behaviors   Outcome: Progressing

## 2019-10-08 NOTE — PROGRESS NOTES
Per report from previous shift:  Drinks water, ensure and eats hermes grahams, refused labs and VS, irritable edge, scant, cat naps  MD: 303 hearing today,   CM: per mom patient does well on prolixin

## 2019-10-08 NOTE — PROGRESS NOTES
Pt has been awake since approximately 0330  Pt remains in room, appears to be responding to internal  Irritable with staff when asked if pt needed anything  Refused labs this morning as well

## 2019-10-08 NOTE — PROGRESS NOTES
Pt seclusive to room  Comes out for meals and meds  Pt drank Ensure for breakfast and was provided snacks to take to her room  Pt disinterested in speaking with writer   Says, "I don't want to talk about it "

## 2019-10-08 NOTE — PROGRESS NOTES
Progress Note - Behavioral Health   Lynda Malin 51 y o  female MRN: [id number]  Unit/Bed#: [facility] 254-02 Encounter: [id number]     Principal Problem:    Schizophrenia ([address] )  Active Problems:    Hypokalemia    Hyponatremia    Eating disorder    Subjective: Patient seen and evaluated this morning while she was sitting up in bed  When asked about her mood, patient reports, I don't want to talk about it," and is irritable with this writer, but less so than yesterday and is more talkative  Patient denies suicidal ideation, homicidal ideation, auditory and visual hallucinations  Patient continues to endorse decreased appetite but per patient and staff notes the patient has been drinking Ensure and eating snacks  Patient has been seclusive to room and irritable with staff  Patient remains fixated on discharge "to a shelter" and still has poor insight, refusing to [facility] unless "they realize that I will only take my medications when I am ready " The patient continues to express irritation about being "forced" to do things she does not want to do  Patient educated on the importance of taking her current medication every day, both in the unit and on discharge  Patient continues to refuse lab work and vital signs  Patient denies any side effects to medication  Patient saw mental health  today and was committed for inpatient treatment not exceeding 10 days  Patient expressed understanding  Collateral from patient's mother that the patient has responded well to prolixin in the past, and treatment team considered initiating this, with the possibility of a later WALDROP, both of which patient has used in the past, but patient is not agreeable to WALDROP or prolixin due to "side effects " Patient cannot pinpoint any side effects but adamantly refuses WALDROP  Behavior over the last 24 hours: Per staff and RN notes, patient has been seclusive to room and irritable with occasional RIS   Patient still refuses labs and vital signs  Patient has been compliant with her scheduled psychiatric medications  Sleep: Patient reports infrequent sleep of "occasional naps" last night  RN notes corroborate this  Appetite: Patient reports continued decreased appetite and has been drinking Ensure up to 3 times per day with occasional snacks, and has also been evaluated by Nutrition during this stay  Medication side effects: Patient denies stiffness in neck or extremities, denies symptoms of restless legs/restlessness, denies GI upset (no nausea, vomiting, constipation, or diarrhea), denies headache, denies over-sedation, and denies any pain or other side effects  ROS: No complaints on ROS  Mental Status Evaluation:  Appearance:  casually dressed and younger than stated age   Behavior:  guarded   Speech:  increased latency of response and loud   Mood:  "I don't want to talk about it"   Affect:  labile and mood-congruent, decreased range   Thought Process:  circumstantial and perserverative about discharge to shelter   Thought Content:  obsessions   Perceptual Disturbances: None   Risk Potential: Denies suicidal ideation, homicidal ideation; no potential for aggression   Sensorium:  person, place and year   Cognition:  grossly intact   Consciousness:  alert    Attention: shorter attention span than expected   Insight:  limited   Judgment: limited   Gait/Station: Patient evaluated while sitting in bed   Motor Activity: no abnormal movements     Progress Toward Goals:   Patient has been compliant with olanzapine 10mg BID and fluoxetine 20mg, but continues to refuse lab work and vital signs  Patient states that she understands why physicians are ordering labs (concern for electrolyte abnormalities and potential for conduction abnormalities) and still adamantly refuses  Recommended Treatment:   - Continue fluoxetine 20mg daily for management of patient's depression and anxiety    - Continue olanzapine for mood stabilization but increase dosing from 10mg BID to 10mg in the morning and 15mg in the evening (total dose increase from 20mg to 25mg per day)  - Continue to monitor patient for side effects of medication   - Patient refused scheduled lab work this morning  Continue discussing lab work with patient if continues to refuse  - For disposition planning, committed for up to 10 days inpatient by mental health official today 10/8/19  Attending physician attempted to discuss with patient that she should consider ultimate discharge to [facility] once stabilized on medications and once she is agreeable to taking medications  Patient disagrees with discharge to [facility] and still wishes to be discharged to a shelter  Patient states she will "consider" ultimate discharge to [facility]  Syed Ramirez is agreeable to allow the patient to return once she is stabilized  Will follow up with patient as week progresses  - Continue with group therapy, milieu therapy and occupational therapy  Risks, benefits and possible side effects of Medications:   Risks, benefits, and possible side effects of medications explained to patient and patient verbalizes understanding  Risks of medications in pregnancy explained in this female patient  Patient verbalizes understanding and agrees to notify her physician if she becomes pregnant  Labs:   Patient continues to refuse lab work and vital signs  CMP ordered for today 10/8/19 in the morning in the setting of recent hyponatremia and hypokalemia on admission were refused by patient  Previous labs reviewed  Most recent CMP on 10/5/19 showed patient's Na+ is 134 (increased from 128 on 10/3/19 admission) and K+ is 4 2 (increased from 2 4 on 10/3/19 admission)  Other lab work reviewed (TSH, phosphorus, serum hCG, magnesium, etc ) and mostly within normal limits      Commitment Status:  Patient was originally admitted on a 36 petitioned by [facility] physicians for danger to self on 10/4/19  303 was filled out by attending physician and patient was evaluated on unit by mental health court this morning  Patient received up to 10 days of inpatient mental health treatment

## 2019-10-09 RX ADMIN — FLUOXETINE 20 MG: 20 CAPSULE ORAL at 08:57

## 2019-10-09 RX ADMIN — OLANZAPINE 15 MG: 10 TABLET, ORALLY DISINTEGRATING ORAL at 17:04

## 2019-10-09 RX ADMIN — OLANZAPINE 10 MG: 10 TABLET, ORALLY DISINTEGRATING ORAL at 08:57

## 2019-10-09 NOTE — PROGRESS NOTES
Pt slept for about 3 hours at the beginning of the shift however woke up in the middle of the night to use restroom  Pt initially confused and walked out into the hickey asking where the bathroom was  Was reoriented  Pt since this time has remained awake however pleasant and cooperative  Mostly sitting in bed  Denies any concerns  Showered and attended to ADL's

## 2019-10-09 NOTE — CASE MANAGEMENT
TILA left voice message with pt's  at [address] [facility] [address] ([phone]) to discuss treatment progress  Requested call back  CM outreached to pt's mother Marci Verma ([phone]) to update her on medications and pt's refusal to have an injectable medications  Mother reports that she understands the limitations but she would appreciate if the treatment team can try and persuade her to take a long acting injectable

## 2019-10-09 NOTE — PROGRESS NOTES
Progress Note - Behavioral Health   Halley Song 51 y o  female MRN: [id number]  Unit/Bed#: [facility] 254-02 Encounter: [id number]     Principal Problem:    Schizophrenia ([address] )  Active Problems:    Hypokalemia    Hyponatremia    Eating disorder    Subjective: Patient seen and evaluated this morning while she was sitting up in bed  When asked about her mood, patient reports, "it's okay" and is much less irritable with this writer than previous days  Patient is also more talkative  Patient denies suicidal ideation, homicidal ideation, auditory and visual hallucinations  Patient continues to endorse decreased appetite but per patient and staff notes the patient has been drinking Ensure and eating snacks  Patient continues to refuse lab work and states that she does not like the way the needle feels or "the marks it makes afterwards " This morning patient states that she will return to [facility] and that she will take medication daily as long as it is "pill or liquid form " Patient denies any side effects to medication increase yesterday  Patient saw mental health  yesterday and was committed for inpatient treatment not exceeding 10 days  Patient expressed understanding with plan for ultimate discharge to [facility] with olanzapine and fluoxetine  Behavior over the last 24 hours: Per staff and RN notes, patient has been seclusive to room and guarded  Patient still refuses labs and vital signs  Patient has been compliant with her scheduled psychiatric medications  Sleep: Patient reports improved sleep of >5 hours last night  Appetite: Patient reports continued decreased appetite and has been drinking Ensure up to 3 times per day with occasional snacks, and has also been evaluated by Nutrition during this stay      Medication side effects: Patient denies stiffness in neck or extremities, denies symptoms of restless legs/restlessness, denies GI upset (no nausea, vomiting, constipation, or diarrhea), denies headache, denies over-sedation, and denies any pain or other side effects  ROS: No complaints on ROS  Mental Status Evaluation:  Appearance:  casually dressed and younger than stated age   Behavior:  guarded   Speech:  increased latency of response and loud   Mood:  "Okay"   Affect:  labile and mood-congruent, decreased range   Thought Process:  circumstantial    Thought Content:  no obsessions about discharge to shelter   Perceptual Disturbances: None   Risk Potential: Denies suicidal ideation, homicidal ideation; no potential for aggression   Sensorium:  person, place and year   Cognition:  grossly intact   Consciousness:  alert    Attention: shorter attention span than expected   Insight:  limited   Judgment: limited   Gait/Station: Patient evaluated while sitting in bed   Motor Activity: no abnormal movements     Progress Toward Goals:   Patient has been compliant with olanzapine 10mg BID (increased on 10/9/19 to 10mg in morning and 15mg in evening) and fluoxetine 20mg, but continues to refuse lab work and vital signs  Recommended Treatment:   - Continue fluoxetine 20mg daily for management of patient's depression and anxiety  - Continue olanzapine for mood stabilization (10mg in the morning and 15mg in the evening)  - Continue to monitor patient for side effects of medication   - Patient refused scheduled lab work this morning  Patient is drinking Ensure regularly and is asymptomatic, sp lab work order discontinued  - For disposition planning, committed for up to 10 days inpatient by mental health official 10/8/19  Patient now stating she is willing to be discharged to [facility] and is agreeable to taking oral medications daily  Alley Smith is agreeable to allow the patient to return once she is stabilized  Will discuss this with treatment team for dispo planning   - Continue with group therapy, milieu therapy and occupational therapy        Risks, benefits and possible side effects of Medications: Risks, benefits, and possible side effects of medications explained to patient and patient verbalizes understanding  Risks of medications in pregnancy explained in this female patient  Patient verbalizes understanding and agrees to notify her physician if she becomes pregnant  Labs:   Patient continues to refuse lab work and vital signs  Orders for lab work discontinued on 10/9/19  Previous labs reviewed  Most recent CMP on 10/5/19 showed patient's Na+ is 134 (increased from 128 on 10/3/19 admission) and K+ is 4 2 (increased from 2 4 on 10/3/19 admission)  Other lab work reviewed (TSH, phosphorus, serum hCG, magnesium, etc ) and mostly within normal limits  Commitment Status:  Patient was originally admitted on a 36 petitioned by [facility] for danger to self on 10/4/19  303 was filled out by attending physician and patient was evaluated on unit by mental health court on 10/8/19  Patient received up to 10 days of inpatient mental health treatment  Patient is agreeable to discharge to [facility], and [facility] is willing to allow patient to return  Will discuss this with treatment team for specific disposition planning

## 2019-10-09 NOTE — PROGRESS NOTES
Patient guarded upon approach and is seclusive to her room  Disinterested in communicating with writer, other than her frustration to return to [facility]   When asked how she is feeling, patient states "I don't want to talk about it "

## 2019-10-09 NOTE — PROGRESS NOTES
Pt politely declined lab work this morning  Would not provide staff with reason for refusal  Attempted to educate pt on importance of lab work however pt continues to refuse

## 2019-10-10 RX ADMIN — FLUOXETINE 20 MG: 20 CAPSULE ORAL at 08:44

## 2019-10-10 RX ADMIN — OLANZAPINE 15 MG: 10 TABLET, ORALLY DISINTEGRATING ORAL at 18:52

## 2019-10-10 RX ADMIN — OLANZAPINE 10 MG: 10 TABLET, ORALLY DISINTEGRATING ORAL at 08:44

## 2019-10-10 NOTE — PROGRESS NOTES
Pt irritable and guarded  Tells writer "I don't want to talk " Offered to bring pt her breakfast tray and pt said she would go to dining room herself  Pt said she has been drinking ensure  Pt stated she does not know if she wants any food from the tray  Pt unwilling to speak with writer further  Compliant with psychiatric meds, refused AM folic acid  Pt isolative and not interested in attending groups or interacting  Refused AM vitals

## 2019-10-10 NOTE — CASE MANAGEMENT
CM outreached to [facility] who report they intend to visit with pt on Friday to discuss plan for dc  Cm spoke with Pavan Mendez who reports that the fax number to send the medication list is [phone]  Scripts will need to faxed to [facility] on Friday for Monday dc

## 2019-10-10 NOTE — PLAN OF CARE
Problem: SELF CARE DEFICIT  Goal: Return ADL status to a safe level of function  Description  INTERVENTIONS:  - Administer medication as ordered  - Assess ADL deficits and provide assistive devices as needed  - Obtain PT/OT consults as needed  - Assist and instruct patient to increase activity and self care as tolerated  Outcome: Progressing     Problem: Nutrition/Hydration-ADULT  Goal: Nutrient/Hydration intake appropriate for improving, restoring or maintaining nutritional needs  Description  Monitor and assess patient's nutrition/hydration status for malnutrition  Collaborate with interdisciplinary team and initiate plan and interventions as ordered  Monitor patient's weight and dietary intake as ordered or per policy  Utilize nutrition screening tool and intervene as necessary  Determine patient's food preferences and provide high-protein, high-caloric foods as appropriate       INTERVENTIONS:  - Monitor oral intake, urinary output, labs, and treatment plans  - Assess nutrition and hydration status and recommend course of action  - Evaluate amount of meals eaten  - Assist patient with eating if necessary   - Allow adequate time for meals  - Recommend/ encourage appropriate diets, oral nutritional supplements, and vitamin/mineral supplements  - Order, calculate, and assess calorie counts as needed  - Recommend, monitor, and adjust tube feedings and TPN/PPN based on assessed needs  - Assess need for intravenous fluids  - Provide specific nutrition/hydration education as appropriate  - Include patient/family/caregiver in decisions related to nutrition  Outcome: Progressing

## 2019-10-10 NOTE — PROGRESS NOTES
Patient appears to have slept throughout the night waking up once to use the bathroom returning to her bad and asleep within the hour

## 2019-10-10 NOTE — PROGRESS NOTES
Progress Note - Behavioral Health   Juan Pablo Haskins 51 y o  female MRN: [id number]  Unit/Bed#: [facility] 254-02 Encounter: [id number]     Principal Problem:    Schizophrenia ([address] )  Active Problems:    Hypokalemia    Hyponatremia    Eating disorder    Subjective: Patient seen and evaluated this morning in her room  Patient reports her mood is "don't want to talk about it," but still remains less irritable with this writer  Patient denies suicidal ideation, homicidal ideation, auditory and visual hallucinations  Patient contracts to safety on the unit  Patient endorses decreased appetite but per patient and staff notes the patient has been drinking Ensure and eating snacks  Patient continues to have improved insight and still agrees to return to [facility] and to take her medications daily  Patient continues to deny any side effects to medication increase on Tuesday  Patient is compliant with her medications  She continues to refuse lab work and vital signs and this writer with attending physician explained to the patient that the lab work, urinalysis, and other orders will be discontinued since the patient does not have any symptoms worrisome for electrolyte imbalance  Patient advised to alert staff if she begins to feel any signs such as numbness/tingling in extremities, chest discomfort, or weakness  Patient saw mental health  on 10/8/19 and was committed for inpatient treatment not exceeding 10 days  Patient continues to express agreement with plan for ultimate discharge to [facility] with olanzapine and fluoxetine  Tentative plan for discharge to [facility] on Monday with psychiatric follow-up there  Patient is aware and expresses understanding  Behavior over the last 24 hours: Per staff and RN notes, patient has been seclusive to room  Patient has been compliant with her scheduled psychiatric medications       Sleep: Patient reports improved sleep of >6 hours last night and nursing notes that patient slept through the night with minimal awakenings  Appetite: Patient reports continued decreased appetite and has been drinking Ensure up to 3 times per day with occasional snacks, and has also been evaluated by Nutrition during this stay  Medication side effects: Patient denies stiffness in neck or extremities, denies symptoms of restless legs/restlessness, denies GI upset (no nausea, vomiting, constipation, or diarrhea), denies headache, denies over-sedation, and denies any pain or other side effects  ROS: No complaints on ROS  Mental Status Evaluation:  Appearance:  casually dressed and younger than stated age   Behavior:  guarded   Speech:  increased latency of response and loud   Mood:  "don't want to talk about it"   Affect:  labile and mood-congruent, increased range   Thought Process:  circumstantial    Thought Content:  Minimal obsessions about discharge to shelter   Perceptual Disturbances: None   Risk Potential: Denies suicidal ideation, homicidal ideation; no signs of aggression   Sensorium:  person, place and year   Cognition:  grossly intact   Consciousness:  alert    Attention: shorter attention span than expected   Insight:  limited   Judgment: limited   Gait/Station: Patient evaluated while sitting in bed   Motor Activity: no abnormal movements     Progress Toward Goals:   Patient has been compliant with olanzapine 10mg BID (increased on 10/9/19 to 10mg in morning and 15mg in evening) and fluoxetine 20mg, but continues to refuse lab work and vital signs  Recommended Treatment:   - Continue fluoxetine 20mg daily for management of patient's depression and anxiety  - Continue olanzapine for mood stabilization (10mg in the morning and 15mg in the evening)  - Continue to monitor patient for side effects of medication   - Patient continues to refuse scheduled lab work  Patient is drinking Ensure regularly and is asymptomatic, so lab work, urinalysis, and I&O orders discontinued   Patient agrees to notify staff if she notices any symptoms of electrolyte imbalance (eg , numbness/tingling, weakness)  - For disposition planning, committed for up to 10 days inpatient by mental health official 10/8/19  Patient maintains that she is willing to return to [facility] and to take oral medications daily  David Jean is agreeable to allow the patient to return once she is stabilized  Discussed this with treatment team for dispo planning tentatively on Monday 10/14/19   - Continue with group therapy, milieu therapy and occupational therapy  Risks, benefits and possible side effects of Medications:   Risks, benefits, and possible side effects of medications explained to patient and patient verbalizes understanding  Risks of medications in pregnancy explained in this female patient  Patient verbalizes understanding and agrees to notify her physician if she becomes pregnant  Labs:   Patient continues to refuse lab work and vital signs  Orders for lab work discontinued on 10/9/19  Previous labs reviewed  Most recent CMP on 10/5/19 showed patient's Na+ is 134 (increased from 128 on 10/3/19 admission) and K+ is 4 2 (increased from 2 4 on 10/3/19 admission)  Other lab work reviewed (TSH, phosphorus, serum hCG, magnesium, etc ) and mostly within normal limits  Commitment Status:  Patient was originally admitted on a 36 petitioned by [facility] physicians for danger to self on 10/4/19  303 was filled out by attending physician and patient was evaluated on unit by mental health court on 10/8/19  Patient received up to 10 days of inpatient mental health treatment  Patient is agreeable to discharge to [facility], and [facility] is willing to allow patient to return  Discussed this with treatment team for tentative discharge on Monday 10/14/19 to [facility]

## 2019-10-10 NOTE — PROGRESS NOTES
Pt remains isolative  Scant, guarded, and irritable  Tells writer "I don't feel like talking" but then will answer a few questions when asked  Pt denies SI  Reports drinking some ensure and eating animal crackers today  Pt compliant with scheduled meds  Denies any questions/concerns and agrees to come to writer with any concerns

## 2019-10-10 NOTE — PROGRESS NOTES
10/10/19 0656   Team Meeting   Meeting Type Daily Rounds   Team Members Present   Team Members Present Physician;Nurse;;Occupational Therapist   Physician Team Member Dr Brody Marie Team Member NAHID [facility] Management Team Member Mark/ Kasandra[address]   OT Team Member Darwin   Patient/Family Present   Patient Present No   Patient's Family Present No     Pt is for dc on Monday  Pt is not in agreement with injectable medication  Will return to [facility] on Monday  Taking medications  Still irritable

## 2019-10-10 NOTE — PROGRESS NOTES
Pt sleeps with clothes on and is very disheveled, scant in conversation needs prompting to eat and  Encouragement to do adls

## 2019-10-11 RX ORDER — OLANZAPINE 10 MG/1
10 TABLET ORAL EVERY MORNING
Qty: 14 TABLET | Refills: 0 | Status: ON HOLD | OUTPATIENT
Start: 2019-10-11 | End: 2019-11-05 | Stop reason: SDUPTHER

## 2019-10-11 RX ORDER — FLUOXETINE HYDROCHLORIDE 20 MG/1
20 CAPSULE ORAL DAILY
Qty: 14 CAPSULE | Refills: 0 | Status: SHIPPED | OUTPATIENT
Start: 2019-10-12 | End: 2019-11-06 | Stop reason: HOSPADM

## 2019-10-11 RX ORDER — OLANZAPINE 15 MG/1
15 TABLET ORAL
Qty: 14 TABLET | Refills: 0 | Status: SHIPPED | OUTPATIENT
Start: 2019-10-11 | End: 2019-11-06 | Stop reason: HOSPADM

## 2019-10-11 RX ADMIN — OLANZAPINE 10 MG: 10 TABLET, ORALLY DISINTEGRATING ORAL at 08:51

## 2019-10-11 RX ADMIN — FLUOXETINE 20 MG: 20 CAPSULE ORAL at 08:51

## 2019-10-11 RX ADMIN — OLANZAPINE 15 MG: 10 TABLET, ORALLY DISINTEGRATING ORAL at 18:03

## 2019-10-11 NOTE — CASE MANAGEMENT
CM spoke to nurse Carey Calzada @ [facility] who reports that the residence will pack pt's belongings and she will go directly to [facility]  She received the scripts  Someone from the residence will confirm  on Monday morning

## 2019-10-11 NOTE — PROGRESS NOTES
Progress Note - Behavioral Health   Jewels Grubbs 51 y o  female MRN: [id number]  Unit/Bed#: [facility] 254-02 Encounter: [id number]    Assessment/Plan   Principal Problem:    Schizophrenia ([address] )  Active Problems:    Hypokalemia    Hyponatremia    Eating disorder      Subjective:  Patient constricted and guarded during conversation  Was superficial in relaying how she feels psychiatrically  States she is anxious over returning to [facility]  Does not want to return there and wants to go to [facility]  Also states she would like to go to a shelter  Has limited insight and judgment  Unclear if these are paranoid delusions towards previous living facility  Did appear mildly anxious in discussing this topic  States she feels hopeless and helpless over current situation  Has reduction of passive death wishes and contracts for safety  States she feels "not good" over current disposition situation  Does not appear to be actively manic  Denied all hallucinations when asked  Is seclusive to her room and not attending groups  Does not eat much for her meals  Requires prompting to carry out ADLs  Is on involuntary 303 commitment at this time  Compliant with scheduled medications  Does refuse vital signs        Current Medications:  Current Facility-Administered Medications   Medication Dose Route Frequency    acetaminophen (TYLENOL) tablet 650 mg  650 mg Oral Q6H PRN    acetaminophen (TYLENOL) tablet 650 mg  650 mg Oral Q6H PRN    aluminum-magnesium hydroxide-simethicone (MYLANTA) 200-200-20 mg/5 mL oral suspension 30 mL  30 mL Oral Q4H PRN    benztropine (COGENTIN) injection 1 mg  1 mg Intramuscular Q6H PRN    benztropine (COGENTIN) tablet 1 mg  1 mg Oral Q6H PRN    FLUoxetine (PROzac) capsule 20 mg  20 mg Oral Daily    LORazepam (ATIVAN) 2 mg/mL injection 2 mg  2 mg Intramuscular Q6H PRN    LORazepam (ATIVAN) tablet 1 mg  1 mg Oral Q8H PRN    magnesium hydroxide (MILK OF MAGNESIA) 400 mg/5 mL oral suspension 30 mL  30 mL Oral Daily PRN    OLANZapine (ZyPREXA ZYDIS) dispersible tablet 10 mg  10 mg Oral Daily    OLANZapine (ZyPREXA ZYDIS) dispersible tablet 15 mg  15 mg Oral After Dinner    OLANZapine (ZyPREXA) IM injection 5 mg  5 mg Intramuscular Q8H PRN    OLANZapine (ZyPREXA) tablet 7 5 mg  7 5 mg Oral Q8H PRN    ondansetron (ZOFRAN) injection 4 mg  4 mg Intravenous Q6H PRN    traZODone (DESYREL) tablet 50 mg  50 mg Oral HS PRN       Behavioral Health Medications: all current active meds have been reviewed and continue current psychiatric medications  Vitals:  Vitals:    10/05/19 0735   Resp: 16       Laboratory results:    I have personally reviewed all pertinent laboratory/tests results    Most Recent Labs:   Lab Results   Component Value Date    WBC 4 89 10/04/2019    RBC 3 83 10/04/2019    HGB 11 6 10/04/2019    HCT 34 4 (L) 10/04/2019     10/04/2019    RDW 13 6 10/04/2019    NEUTROABS 3 41 10/04/2019    SODIUM 134 (L) 10/05/2019    K 4 2 10/05/2019    CL 98 (L) 10/05/2019    CO2 21 10/05/2019    BUN 3 (L) 10/05/2019    CREATININE 0 74 10/05/2019    GLUC 75 10/05/2019    CALCIUM 9 5 10/05/2019    AST 29 10/05/2019    ALT 25 10/05/2019    ALKPHOS 57 10/05/2019    TP 7 5 10/05/2019    ALB 4 0 10/05/2019    TBILI 0 70 10/05/2019    CHOLESTEROL 192 10/05/2019    HDL 72 (H) 10/05/2019    TRIG 98 10/05/2019    LDLCALC 100 10/05/2019    [place] 120 10/05/2019    RLH7ZURHRSWD 3 291 10/05/2019    PREGSERUM Negative 10/05/2019       Psychiatric Review of Systems:  Behavior over the last 24 hours:  unchanged  Sleep: normal  Appetite: normal  Medication side effects: No  ROS: no complaints    Mental Status Evaluation:  Appearance:  casually dressed   Behavior:  guarded and seclusive   Speech:  normal pitch and normal volume   Mood:  anxious and depressed   Affect:  anxious   Language repeating phrases and sparse   Thought Process:  concrete and illogical   Thought Content:  obsessions and paranoid delusions   Perceptual Disturbances: None   Risk Potential: Reduction of passive death wishes  Denied HI  Potential for aggression: No   Sensorium:  person, place and time/date   Cognition:  grossly intact   Consciousness:  alert and awake    Recent and Remote Memory limited   Attention: attention span appeared shorter than expected for age   Insight:  limited   Judgment: limited   Gait/Station: normal gait/station and normal balance   Motor Activity: no abnormal movements     Progress Toward Goals: slow progression    Recommended Treatment: Continue with group therapy, milieu therapy and occupational therapy  1   Continue current medications  2  Disposition planning with collaboration of group home    Risks, benefits and possible side effects of Medications:   Risks, benefits, and possible side effects of medications explained to patient and patient verbalizes understanding        Alondra Ontiveros PA-C

## 2019-10-11 NOTE — PROGRESS NOTES
Pt did not wish to speak with writer this morning  She was open to having small talk rather than answering questions about how she is feeling  Says she wants to return to [facility] to pack her belongings before going to new facility  Pt drinks ensure at meals

## 2019-10-11 NOTE — CASE MANAGEMENT
Pt reports productive meeting with Mary Calderon  Pt is going to be transferred to [facility] in [facility] which is a better environment for pt  DC is set for Monday  CM faxed scripts to Mary Calderon and also [address]  No further needs anticipated at time of dc

## 2019-10-11 NOTE — PROGRESS NOTES
10/11/19 2162   Team Meeting   Meeting Type Daily Rounds   Team Members Present   Team Members Present Physician;Nurse;;Occupational Therapist   Physician Team Member Dr Phyllis Lee, NO   Nursing Team Member [facility] Management Team Member Mark/ Hawa Duenas   OT Team Member Darwin   Patient/Family Present   Patient Present No   Patient's Family Present No     Pt is for dc on Monday  Will return to [facility]  CM requested scripts today to fax to pharmacy  Pt will meet with [facility] today

## 2019-10-12 PROCEDURE — 99231 SBSQ HOSP IP/OBS SF/LOW 25: CPT | Performed by: PSYCHIATRY & NEUROLOGY

## 2019-10-12 RX ADMIN — OLANZAPINE 10 MG: 10 TABLET, ORALLY DISINTEGRATING ORAL at 08:44

## 2019-10-12 RX ADMIN — OLANZAPINE 15 MG: 10 TABLET, ORALLY DISINTEGRATING ORAL at 17:42

## 2019-10-12 RX ADMIN — FLUOXETINE 20 MG: 20 CAPSULE ORAL at 08:44

## 2019-10-12 NOTE — PLAN OF CARE
Problem: INVOLUNTARY ADMIT  Goal: Will cooperate with staff recommendations and doctor's orders and will demonstrate appropriate behavior  Description  INTERVENTIONS:  - Treat underlying conditions and offer medication as ordered  - Educate regarding involuntary admission procedures and rules  - Utilize positive consistent limit setting strategies to support patient and staff safety  Outcome: Progressing     Problem: SELF CARE DEFICIT  Goal: Return ADL status to a safe level of function  Description  INTERVENTIONS:  - Administer medication as ordered  - Assess ADL deficits and provide assistive devices as needed  - Obtain PT/OT consults as needed  - Assist and instruct patient to increase activity and self care as tolerated  Outcome: Progressing     Problem: Alteration in Thoughts and Perception  Goal: Verbalize thoughts and feelings  Description  Interventions:  - Promote a nonjudgmental and trusting relationship with the patient through active listening and therapeutic communication  - Assess patient's level of functioning, behavior and potential for risk  - Engage patient in 1 on 1 interactions  - Encourage patient to express fears, feelings, frustrations, and discuss symptoms    - [place]z patient to reality, help patient recognize reality-based thinking   - Administer medications as ordered and assess for potential side effects  - Provide the patient education related to the signs and symptoms of the illness and desired effects of prescribed medications  Outcome: Progressing  Goal: Refrain from acting on delusional thinking/internal stimuli  Description  Interventions:  - Monitor patient closely, per order   - Utilize least restrictive measures   - Set reasonable limits, give positive feedback for acceptable   - Administer medications as ordered and monitor of potential side effects  Outcome: Progressing  Goal: Agree to be compliant with medication regime, as prescribed and report medication side effects  Description  Interventions:  - Offer appropriate PRN medication and supervise ingestion; conduct AIMS, as needed   Outcome: Progressing     Problem: Risk for Self Injury/Neglect  Goal: Refrain from harming self  Description  Interventions:  - Monitor patient closely, per order  - Develop a trusting relationship  - Supervise medication ingestion, monitor effects and side effects   Outcome: Progressing  Goal: Complete daily ADLs, including personal hygiene independently, as able  Description  Interventions:  - Observe, teach, and assist patient with ADLS  - Monitor and promote a balance of rest/activity, with adequate nutrition and elimination  Outcome: Progressing

## 2019-10-12 NOTE — PROGRESS NOTES
Progress Note - Behavioral Health   Tempie Hair 51 y o  female MRN: [id number]  [facility]/Bed#: [facility]-02 Encounter: [id number]    The patient was seen for continuing care and reviewed with treatment team     Mental Status Evaluation:  Appearance:  Child=like   Behavior:  argumentative   Mood:  irritable   Affect: mood-congruent   Speech: Normal rate and Normal volume   Thought Process:  Goal directed and coherent   Thought Content:  Paranoid and mistrustful and Obsessions   Perceptual Disturbances: Denies hallucinations and does not appear to be responding to internal stimuli at times seemed preoccupied   Risk Potential: Denies SI/HI   Orientation:   Person, place, siutation     Progress Toward Goals:  Patient seen in room, requests a soap suds enema  Denies abd pain, said last bowel movement was few days ago, does not use enema at home and refuses all other medications or treatments for constipation other than enema  "If you don't want to give me one just say it " Feels the zyprexa is helping, does not want to attend groups "unless you make me I am not going" and focused on living independently and not to supportive housing  Assessment/Plan    [facility]     Recommended Treatment: Continue with pharmacotherapy, group therapy, milieu therapy and occupational therapy    The patient will be maintained on the following medications:    Current Facility-Administered Medications:  acetaminophen 650 mg Oral Q6H PRN Luis Miguel Cohn MD   acetaminophen 650 mg Oral Q6H PRN Luis Miguel Cohn MD   aluminum-magnesium hydroxide-simethicone 30 mL Oral Q4H PRN Luis Miguel Cohn MD   benztropine 1 mg Intramuscular Q6H PRN Luis Miguel Cohn MD   benztropine 1 mg Oral Q6H PRN Luis Miguel Cohn MD   FLUoxetine 20 mg Oral Daily Luis Miguel Cohn MD   LORazepam 2 mg Intramuscular Q6H PRN Luis Miguel Cohn MD   LORazepam 1 mg Oral Q8H PRN Luis Miguel Cohn MD   magnesium hydroxide 30 mL Oral Daily PRN Luis Miguel Cohn MD   OLANZapine 10 mg Oral Daily David Cameron   OLANZapine 15 mg Oral After Dinner David Cameron   OLANZapine 5 mg Intramuscular Q8H PRN Shelley Ruelas MD   OLANZapine 7 5 mg Oral Q8H PRN Shelley Ruelas MD   ondansetron 4 mg Intravenous Q6H PRN Shelley Ruelas MD   traZODone 50 mg Oral HS PRN Shelley Ruelas MD

## 2019-10-12 NOTE — CMS CERTIFICATION NOTE
Certification Statement -  Ayanna Peck  ICX:[id number]  MRN: [id number]    [address] [place] [address] Room / Bed: [place] [address]/[facility] 099-28 Encounter: [id number]    I certify that Ayanna Peck requires further inpatient hospitalization beyond 20 days due to continued poor insight and judgement, difficulty with disposition into appropriate housing, poor po intake       Gi Blanco MD     Date: 10/12/2019  Time: 10:37 AM

## 2019-10-12 NOTE — PROGRESS NOTES
Patient in her room most of the evening out to dinning room only taking ensure as meal   Patient interacted in small talk  Reports she wants to be out of the mental health system  States she has been at [facility] before and hopes she will be able to start eating food  When this writer asked her about meals she expressed she doesn't understand why she doesn't want to eat food  Stating she is a vegetarian and likes fruits, vegatables, yogurt, and cereal "rasin bran and rice krispies "  This writer encourage her to fill out menus and patient declines stating she would rather have a house tray  This writer encouraged her to try and take a bite or two of vegetables tomorrow  She states she drinks the "shakes and will eat hermes grahams "  She was encouraged to add peanut butter with her hermes grahams  Patient denied SI/HI  No depression or anxiety when this writer asked questions rapidly  Patient requesting [facility] be [facility] her belongings as she is concerned they will not send everything that is hers  She was focused on getting all her toiletries  Patient was calm and pleasant during conversation

## 2019-10-12 NOTE — PROGRESS NOTES
Pt pleasant and polite  No irritability noted today  Remains seclusive to room  Eating animal crackers this morning and says she will drink her ensure  Encouraged pt to try to eat her meal  Pt said "I'm just having trouble making the decision to eat " Discussed upcoming discharge  Pt said she has been to [facility] in the past but does not remember what it was like  Pt said it was a long time ago  When asked about symptoms, pt said she does not like to think about how she is feeling  Pt denies SI  Mildly anxious regarding discharge  Refused AM vitals  No questions/concerns at this time

## 2019-10-12 NOTE — PROGRESS NOTES
Daily rounds  Reasons for admission reviewed  Per shift report, pt remains isolative  Less irritable  Continues to decline meals but drinks ensure/eats snacks  Pt denies symptoms

## 2019-10-13 RX ADMIN — FLUOXETINE 20 MG: 20 CAPSULE ORAL at 08:34

## 2019-10-13 RX ADMIN — OLANZAPINE 15 MG: 10 TABLET, ORALLY DISINTEGRATING ORAL at 20:32

## 2019-10-13 RX ADMIN — OLANZAPINE 10 MG: 10 TABLET, ORALLY DISINTEGRATING ORAL at 08:34

## 2019-10-13 NOTE — PROGRESS NOTES
Daily rounds  Per shift report, pt remained seclusive to room  No SI  Continues to eat snacks and drinks ensure  Went to be early and appeared to sleep throughout night

## 2019-10-13 NOTE — PROGRESS NOTES
Pt pleasant on approach  Continues to refuse AM vitals, meals consist of ensure and animal crackers  Pt somewhat anxious about discharge tomorrow but says she feels ready  Pt asking what time she will leave and appropriate questions about discharge process  Pt requested to get clothing out of locker for tomorrow and was assisted with this   Pt denies SI

## 2019-10-13 NOTE — PLAN OF CARE
Problem: INVOLUNTARY ADMIT  Goal: Will cooperate with staff recommendations and doctor's orders and will demonstrate appropriate behavior  Description  INTERVENTIONS:  - Treat underlying conditions and offer medication as ordered  - Educate regarding involuntary admission procedures and rules  - Utilize positive consistent limit setting strategies to support patient and staff safety  Outcome: Progressing     Problem: SELF CARE DEFICIT  Goal: Return ADL status to a safe level of function  Description  INTERVENTIONS:  - Administer medication as ordered  - Assess ADL deficits and provide assistive devices as needed  - Obtain PT/OT consults as needed  - Assist and instruct patient to increase activity and self care as tolerated  Outcome: Progressing     Problem: Nutrition/Hydration-ADULT  Goal: Nutrient/Hydration intake appropriate for improving, restoring or maintaining nutritional needs  Description  Monitor and assess patient's nutrition/hydration status for malnutrition  Collaborate with interdisciplinary team and initiate plan and interventions as ordered  Monitor patient's weight and dietary intake as ordered or per policy  Utilize nutrition screening tool and intervene as necessary  Determine patient's food preferences and provide high-protein, high-caloric foods as appropriate       INTERVENTIONS:  - Monitor oral intake, urinary output, labs, and treatment plans  - Assess nutrition and hydration status and recommend course of action  - Evaluate amount of meals eaten  - Assist patient with eating if necessary   - Allow adequate time for meals  - Recommend/ encourage appropriate diets, oral nutritional supplements, and vitamin/mineral supplements  - Order, calculate, and assess calorie counts as needed  - Recommend, monitor, and adjust tube feedings and TPN/PPN based on assessed needs  - Assess need for intravenous fluids  - Provide specific nutrition/hydration education as appropriate  - Include patient/family/caregiver in decisions related to nutrition  Outcome: Progressing  Goal: Nutrient/Hydration intake appropriate for improving, restoring or maintaining nutritional needs  Description  Monitor and assess patient's nutrition/hydration status for malnutrition  Collaborate with interdisciplinary team and initiate plan and interventions as ordered  Monitor patient's weight and dietary intake as ordered or per policy  Utilize nutrition screening tool and intervene as necessary  Determine patient's food preferences and provide high-protein, high-caloric foods as appropriate       INTERVENTIONS:  - Monitor oral intake, urinary output, labs, and treatment plans  - Assess nutrition and hydration status and recommend course of action  - Evaluate amount of meals eaten  - Assist patient with eating if necessary   - Allow adequate time for meals  - Recommend/ encourage appropriate diets, oral nutritional supplements, and vitamin/mineral supplements  - Order, calculate, and assess calorie counts as needed  - Recommend, monitor, and adjust tube feedings and TPN/PPN based on assessed needs  - Assess need for intravenous fluids  - Provide specific nutrition/hydration education as appropriate  - Include patient/family/caregiver in decisions related to nutrition  Outcome: Progressing     Problem: Alteration in Thoughts and Perception  Goal: Verbalize thoughts and feelings  Description  Interventions:  - Promote a nonjudgmental and trusting relationship with the patient through active listening and therapeutic communication  - Assess patient's level of functioning, behavior and potential for risk  - Engage patient in 1 on 1 interactions  - Encourage patient to express fears, feelings, frustrations, and discuss symptoms    - [place] patient to reality, help patient recognize reality-based thinking   - Administer medications as ordered and assess for potential side effects  - Provide the patient education related to the signs and symptoms of the illness and desired effects of prescribed medications  Outcome: Progressing  Goal: Refrain from acting on delusional thinking/internal stimuli  Description  Interventions:  - Monitor patient closely, per order   - Utilize least restrictive measures   - Set reasonable limits, give positive feedback for acceptable   - Administer medications as ordered and monitor of potential side effects  Outcome: Progressing  Goal: Agree to be compliant with medication regime, as prescribed and report medication side effects  Description  Interventions:  - Offer appropriate PRN medication and supervise ingestion; conduct AIMS, as needed   Outcome: Progressing     Problem: Risk for Self Injury/Neglect  Goal: Refrain from harming self  Description  Interventions:  - Monitor patient closely, per order  - Develop a trusting relationship  - Supervise medication ingestion, monitor effects and side effects   Outcome: Progressing  Goal: Complete daily ADLs, including personal hygiene independently, as able  Description  Interventions:  - Observe, teach, and assist patient with ADLS  - Monitor and promote a balance of rest/activity, with adequate nutrition and elimination  Outcome: Progressing

## 2019-10-13 NOTE — PROGRESS NOTES
Progress Note - Behavioral Health   Blanche Lundberg 51 y o  female MRN: [id number]  Unit/Bed#: [facility] 254-02 Encounter: [id number]    The patient was seen for continuing care and reviewed with treatment team     Mental Status Evaluation:  Appearance:  childlike   Behavior:  guarded   Mood:  euthymic   Affect: mood-incongruent   Speech: Sparse   Thought Process:  Poverty of thoughts   Thought Content:  Paranoid and mistrustful   Perceptual Disturbances: Denies hallucinations and does not appear to be responding to internal stimuli   Risk Potential: No suicidal or homicidal ideation   Orientation:   Person, place     Progress Toward Goals: Asks when she is to leave tomorrow and last night per RN report she was anxious and focused on toiletries to bring  No requests for laxatives of enemas today, denies SI/HI  Still looks internally preoccupied and she has been isolative to her room  Assessment/Plan    [facility]     Recommended Treatment: Continue with pharmacotherapy, group therapy, milieu therapy and occupational therapy    The patient will be maintained on the following medications:    Current Facility-Administered Medications:  acetaminophen 650 mg Oral Q6H PRN Leann Orosco MD   acetaminophen 650 mg Oral Q6H PRN Leann Orosco MD   aluminum-magnesium hydroxide-simethicone 30 mL Oral Q4H PRN Leann Orosco MD   benztropine 1 mg Intramuscular Q6H PRN Leann Orosco MD   benztropine 1 mg Oral Q6H PRN Leann rOosco MD   FLUoxetine 20 mg Oral Daily Leann Orosco MD   LORazepam 2 mg Intramuscular Q6H PRN Leann Orosco MD   LORazepam 1 mg Oral Q8H PRN Leann Orosco MD   magnesium hydroxide 30 mL Oral Daily PRN Leann Orosco MD   OLANZapine 10 mg Oral Daily David Cameron   OLANZapine 15 mg Oral After Dinner David Cameron   OLANZapine 5 mg Intramuscular Q8H PRN Leann Orosco MD   OLANZapine 7 5 mg Oral Q8H PRN Leann Orosco MD   ondansetron 4 mg Intravenous Q6H PRN Leann Orosco MD traZODone 50 mg Oral HS PRN Lacey Gurrola MD

## 2019-10-13 NOTE — PROGRESS NOTES
Observed asleep in bed at the onset of the shift   Able to sleep until approximately 0500, when observed awake, retsting quietly in bed

## 2019-10-13 NOTE — PROGRESS NOTES
Pt remains mostly seclusive to room  Calm and cooperative  Denies SI/HI  Denies any concerns at this time  Pt declined to attend group stating she was tired and likes to go to sleep early  Pt however does agree to have snack/ensure this evening  No questions at this time

## 2019-10-14 VITALS
HEIGHT: 65 IN | WEIGHT: 138 LBS | HEART RATE: 65 BPM | TEMPERATURE: 96.5 F | BODY MASS INDEX: 22.99 KG/M2 | DIASTOLIC BLOOD PRESSURE: 65 MMHG | RESPIRATION RATE: 16 BRPM | SYSTOLIC BLOOD PRESSURE: 99 MMHG

## 2019-10-14 PROCEDURE — 99238 HOSP IP/OBS DSCHRG MGMT 30/<: CPT | Performed by: PHYSICIAN ASSISTANT

## 2019-10-14 RX ADMIN — OLANZAPINE 10 MG: 10 TABLET, ORALLY DISINTEGRATING ORAL at 08:36

## 2019-10-14 RX ADMIN — FLUOXETINE 20 MG: 20 CAPSULE ORAL at 08:35

## 2019-10-14 NOTE — CASE MANAGEMENT
CM met with pt to discuss dc planning  Pt reports that she is nervous about the transition to [facility] but she is ready for a change  New address will be [address] , [place], [place], [address]  Pt's scripts already faxed to [facility] on Friday  CM waiting on [facility] to confirm follow up appointments  Pt denies SI  Pt signed IMM letter  [facility] will  pt at time of dc this morning  No time provided at this time  Update: Luis from [facility] will pick pt up around 11 this morning  Luis confirms that pt will see Dr Anette Basurto as part of his rounding @ [facility] this week

## 2019-10-14 NOTE — PROGRESS NOTES
Pt remains pleasant and calm this evening  States she likes to take her zyprexa later in the evening as it makes her tired and she can sleep better  Denies SI/HI, anxiety or depression  Pt reports she is looking forward to discharge tomorrow  Smiling in conversation  Denies AH/VH

## 2019-10-14 NOTE — DISCHARGE INSTR - OTHER ORDERS
If you are in a Crisis situation Call [phone] to speak to a trained crisis worker - Anytime - Day or Night  You may also call one of the numbers below:  Ramon Webber:   [phone]

## 2019-10-14 NOTE — DISCHARGE SUMMARY
Discharge Summary - [address] 51 y o  female MRN: [id number]  Unit/Bed#: [facility] 581-67 Encounter: [id number]     Admission Date:   Admission Orders (From admission, onward)     Ordered        10/04/19 1607  DISCHARGE READMIT Admit Patient to [address] [place] [address] (use with Discharge Readmit Navigator in [facility]4 Discharge Readmit scenario including from any IP unit or different campus ED to [facility])  Nurse to release order when pt  arrives to [facility] Unit  Once                         Discharge Date: 10/14/19    Attending Psychiatrist: Jarod Hernandez MD    Reason for Admission/HPI:   History of Present Illness     Patient is a 41-year-old female who presented to [facility] ED due to refusal to take medications for 2-3 weeks and not eating much food at [facility]  Psychiatric consult was performed in the ED  Allegedly patient tried to elope from [facility] to stay in a shelter  Patient has a POA and this is not legally possible  Patient appeared to have poor insight and judgment  Due to not eating food and she had dangerously low potassium and sodium levels, which she appeared to have poor insight into this issue  This led to patient being admitted to [place] [address] unit for further medical treatment  Due to refusal to take medications and inability to take care of herself patient was involuntarily committed  On subsequent psychiatric evaluation patient reports that she did not like where she lives, does not like to be social others, and has been more isolative  She stated she did not eat much for 2 whole weeks  She stated appetite was low and to had unintentional weight loss  She did say she was drinking water  Patient did report having paranoid thoughts  She denied all forms of hallucinations  Denied anxiety symptoms  She denied most depressive symptoms  She did not endorse criteria for gabbie    Patient has history of prior inpatient psychiatric hospitalizations, denied prior suicide attempts, and does have current outpatient psychiatrist     Psychosocial Stressors: chronic mental illness, residential, medication noncompliance  Hospital Course:   Behavioral Health Medications:   current meds:   Current Facility-Administered Medications   Medication Dose Route Frequency    acetaminophen (TYLENOL) tablet 650 mg  650 mg Oral Q6H PRN    acetaminophen (TYLENOL) tablet 650 mg  650 mg Oral Q6H PRN    aluminum-magnesium hydroxide-simethicone (MYLANTA) 200-200-20 mg/5 mL oral suspension 30 mL  30 mL Oral Q4H PRN    benztropine (COGENTIN) injection 1 mg  1 mg Intramuscular Q6H PRN    benztropine (COGENTIN) tablet 1 mg  1 mg Oral Q6H PRN    FLUoxetine (PROzac) capsule 20 mg  20 mg Oral Daily    LORazepam (ATIVAN) 2 mg/mL injection 2 mg  2 mg Intramuscular Q6H PRN    LORazepam (ATIVAN) tablet 1 mg  1 mg Oral Q8H PRN    magnesium hydroxide (MILK OF MAGNESIA) 400 mg/5 mL oral suspension 30 mL  30 mL Oral Daily PRN    OLANZapine (ZyPREXA ZYDIS) dispersible tablet 10 mg  10 mg Oral Daily    OLANZapine (ZyPREXA ZYDIS) dispersible tablet 15 mg  15 mg Oral After Dinner    OLANZapine (ZyPREXA) IM injection 5 mg  5 mg Intramuscular Q8H PRN    OLANZapine (ZyPREXA) tablet 7 5 mg  7 5 mg Oral Q8H PRN    ondansetron (ZOFRAN) injection 4 mg  4 mg Intravenous Q6H PRN    traZODone (DESYREL) tablet 50 mg  50 mg Oral HS PRN       Patient was admitted to [address] [place] [address] inpatient psychiatric unit on involuntary 302 commitment (extended to 303) for safety and stabilization  On admission patient was continued on Zyprexa 20mg HS for mood stabilization/psychosis and Prozac 20mg QD for depression  Zyprexa was changed to 10mg QD AM + 15mg QD PM to spread out dosing reducing risk of hypotension  She did not require further titration or PRN psychiatric medications  She did often refuse vital signs and lab work but did remain compliant with psychiatric medications    She was more cooperative over course of hospitalization  She tolerated final medications with no acute side effects  Her mood brightened over the course of her treatment, but she remained seclusive to herself inside her room  She did not attend groups  She did not demonstrate dangerous behavior to self or others during her inpatient stay  On day of discharge patient had reduced depression, more controllable anxiety, denied psychosis, appeared less paranoid, did not show signs of gabbie, and denied suicidal/homicidal ideations  She was willing to work with [facility] recommendations  Mental Status at time of Discharge:     Appearance:  casually dressed   Behavior:  more cooperative, guarded   Speech:  normal pitch and normal volume   Mood:  euthymic   Affect:  constricted   Thought Process:  concrete and goal directed   Thought Content:  obsessions and less paranoid   Perceptual Disturbances: None   Risk Potential: denied SI/HI  Potential for aggression: No   Sensorium:  person, place and time/date   Cognition:  grossly intact   Consciousness:  alert and awake    Attention: attention span appeared shorter than expected for age   Insight:  limited   Judgment: improved   Gait/Station: normal gait/station and normal balance   Motor Activity: no abnormal movements       Discharge Diagnosis:   Schizophrenia   Eating disorder      Discharge Medications:  See after visit summary for reconciled discharge medications provided to patient and family  Discharge instructions/Information to patient and family:   See after visit summary for information provided to patient and family  Provisions for Follow-Up Care:  See after visit summary for information related to follow-up care and any pertinent home health orders  Discharge Statement   I spent 32 minutes discharging the patient  This time was spent on the day of discharge  I had direct contact with the patient on the day of discharge    On day of discharge patient had mental status exam performed, discharge instructions/medications reviewed, and outpatient planning discussed  She was given 2 weeks of scripts faxed over to pharmacy       Nathan Diaz PA-C

## 2019-10-14 NOTE — PLAN OF CARE
Problem: INVOLUNTARY ADMIT  Goal: Will cooperate with staff recommendations and doctor's orders and will demonstrate appropriate behavior  Description  INTERVENTIONS:  - Treat underlying conditions and offer medication as ordered  - Educate regarding involuntary admission procedures and rules  - Utilize positive consistent limit setting strategies to support patient and staff safety  Outcome: Progressing     Problem: SELF CARE DEFICIT  Goal: Return ADL status to a safe level of function  Description  INTERVENTIONS:  - Administer medication as ordered  - Assess ADL deficits and provide assistive devices as needed  - Obtain PT/OT consults as needed  - Assist and instruct patient to increase activity and self care as tolerated  Outcome: Progressing     Problem: Nutrition/Hydration-ADULT  Goal: Nutrient/Hydration intake appropriate for improving, restoring or maintaining nutritional needs  Description  Monitor and assess patient's nutrition/hydration status for malnutrition  Collaborate with interdisciplinary team and initiate plan and interventions as ordered  Monitor patient's weight and dietary intake as ordered or per policy  Utilize nutrition screening tool and intervene as necessary  Determine patient's food preferences and provide high-protein, high-caloric foods as appropriate       INTERVENTIONS:  - Monitor oral intake, urinary output, labs, and treatment plans  - Assess nutrition and hydration status and recommend course of action  - Evaluate amount of meals eaten  - Assist patient with eating if necessary   - Allow adequate time for meals  - Recommend/ encourage appropriate diets, oral nutritional supplements, and vitamin/mineral supplements  - Order, calculate, and assess calorie counts as needed  - Recommend, monitor, and adjust tube feedings and TPN/PPN based on assessed needs  - Assess need for intravenous fluids  - Provide specific nutrition/hydration education as appropriate  - Include patient/family/caregiver in decisions related to nutrition  Outcome: Progressing  Goal: Nutrient/Hydration intake appropriate for improving, restoring or maintaining nutritional needs  Description  Monitor and assess patient's nutrition/hydration status for malnutrition  Collaborate with interdisciplinary team and initiate plan and interventions as ordered  Monitor patient's weight and dietary intake as ordered or per policy  Utilize nutrition screening tool and intervene as necessary  Determine patient's food preferences and provide high-protein, high-caloric foods as appropriate       INTERVENTIONS:  - Monitor oral intake, urinary output, labs, and treatment plans  - Assess nutrition and hydration status and recommend course of action  - Evaluate amount of meals eaten  - Assist patient with eating if necessary   - Allow adequate time for meals  - Recommend/ encourage appropriate diets, oral nutritional supplements, and vitamin/mineral supplements  - Order, calculate, and assess calorie counts as needed  - Recommend, monitor, and adjust tube feedings and TPN/PPN based on assessed needs  - Assess need for intravenous fluids  - Provide specific nutrition/hydration education as appropriate  - Include patient/family/caregiver in decisions related to nutrition  Outcome: Progressing     Problem: Alteration in Thoughts and Perception  Goal: Verbalize thoughts and feelings  Description  Interventions:  - Promote a nonjudgmental and trusting relationship with the patient through active listening and therapeutic communication  - Assess patient's level of functioning, behavior and potential for risk  - Engage patient in 1 on 1 interactions  - Encourage patient to express fears, feelings, frustrations, and discuss symptoms    - [place] patient to reality, help patient recognize reality-based thinking   - Administer medications as ordered and assess for potential side effects  - Provide the patient education related to the signs and symptoms of the illness and desired effects of prescribed medications  Outcome: Progressing  Goal: Refrain from acting on delusional thinking/internal stimuli  Description  Interventions:  - Monitor patient closely, per order   - Utilize least restrictive measures   - Set reasonable limits, give positive feedback for acceptable   - Administer medications as ordered and monitor of potential side effects  Outcome: Progressing  Goal: Agree to be compliant with medication regime, as prescribed and report medication side effects  Description  Interventions:  - Offer appropriate PRN medication and supervise ingestion; conduct AIMS, as needed   Outcome: Progressing     Problem: Risk for Self Injury/Neglect  Goal: Refrain from harming self  Description  Interventions:  - Monitor patient closely, per order  - Develop a trusting relationship  - Supervise medication ingestion, monitor effects and side effects   Outcome: Progressing  Goal: Complete daily ADLs, including personal hygiene independently, as able  Description  Interventions:  - Observe, teach, and assist patient with ADLS  - Monitor and promote a balance of rest/activity, with adequate nutrition and elimination  Outcome: Progressing     Problem: Ineffective Coping  Goal: Participates in unit activities  Description  Interventions:  - Provide therapeutic environment   - Provide required programming   - Redirect inappropriate behaviors   Outcome: Progressing

## 2019-10-14 NOTE — PROGRESS NOTES
Patient pleasant but scant during our interaction  Reports being a little anxious about discharge  Denies any SI/HI  Will continue to monitor and support

## 2019-10-14 NOTE — PROGRESS NOTES
10/14/19 07   Team Meeting   Meeting Type Daily Rounds   Team Members Present   Team Members Present Physician;Nurse;;Occupational Therapist   Physician Team Member Dr Marivel Chopra Team Member [facility] Management Team Member Mark/ [address]   OT Team Member Darwin   Patient/Family Present   Patient Present No   Patient's Family Present No     Pt is going to [employer] at time of dc  Pt reports improved sleep  Pt is for dc today  Scripts have been set

## 2019-10-14 NOTE — BH TRANSITION RECORD
Contact Information: If you have any questions, concerns, pended studies, tests and/or procedures, or emergencies regarding your inpatient behavioral health visit  Please contact [facility] behavioral health unit [phone] and ask to speak to a , nurse or physician  A contact is available 24 hours/ 7 days a week at this number  Summary of Procedures Performed During your Stay:  Below is a list of major procedures performed during your hospital stay and a summary of results:  - No major procedures performed  Pending Studies (From admission, onward)    None        If studies are pending at discharge, follow up with your PCP and/or referring provider

## 2019-10-14 NOTE — DISCHARGE INSTRUCTIONS
Schizophrenia   WHAT YOU NEED TO KNOW:   Schizophrenia is a long-term mental disease that affects how your brain works  It is a disease that may change how you think, feel, and behave  You may not be able to know what is real and what is not real  Your thoughts may not be clear, or may jump from one topic to another  DISCHARGE INSTRUCTIONS:   Medicines:   · Antipsychotics: These help decrease psychotic symptoms and severe agitation  You may need antiparkinson medicine to control muscle stiffness, twitches, and restlessness caused by antipsychotic medicines  · Antianxiety medicine: This medicine may be given to decrease anxiety and help you feel calm and relaxed  · Antidepressants: These help with symptoms of depression and anxiety  · Mood stabilizers: These control mood swings  · Tranquilizers: These increase feelings of being calm and relaxed  · Take your medicine as directed  Contact your healthcare provider if you think your medicine is not helping or if you have side effects  Tell him or her if you are allergic to any medicine  Keep a list of the medicines, vitamins, and herbs you take  Include the amounts, and when and why you take them  Bring the list or the pill bottles to follow-up visits  Carry your medicine list with you in case of an emergency  Follow up with your healthcare provider or psychiatrist as directed:  Write down your questions so you remember to ask them during your visits  Treatment settings: You may need to continue your treatments after you leave the hospital  You may be treated in the following programs:  · Crisis residential program:  This is a program where you live in a home-care facility  Healthcare providers work in these homes just like in hospitals  This program is helpful especially when you are having a relapse (your symptoms return)  · Day treatment program:  This program provides a chance to learn and practice skills   This also provides long-term support so you may have an improved quality of life  · Outpatient program:  An outpatient program is when you meet regularly with your therapist  Nicolasa Stephan may meet one-to-one with your therapist, or you might meet with your therapist in a group  · Partial care program:  A partial care program is also called day hospitalization or partial hospitalization  This is group therapy and lasts 4 to 6 hours a day, 3 to 5 days a week  It may help you avoid going into the hospital or help you get out of the hospital sooner  It may also help you get symptoms under control and avoid a relapse  Therapy:   · Assertive community treatment:  A team of healthcare providers or psychiatrists and support groups in your community help you with your therapy  · Cognitive behavior therapy: This therapy helps you to change certain behaviors  It will help you handle symptoms such as hallucinations and delusions  · Illness-management skills: This type of therapy teaches you what you can do to help manage your disease  · Family psychoeducation:  Your family will be part of your therapy  · Social skills training: This training helps you learn how to get along with other people  · Supported employment: This is a form of therapy where you are placed into a job that fits your skills  It will help give you independence and self-confidence  Manage your symptoms:   · Do not stop taking your medicines:  Tell your healthcare provider or psychiatrist if you have any problems with or questions about your medicines  · Do not stop your therapies: It is normal to have doubts about or feel discomfort with your therapy  Tell your healthcare provider or psychiatrist if you are not comfortable or have questions about your therapies  · Get regular sleep:  Try to get 6 to 8 hours of sleep each night  Tell your healthcare provider or psychiatrist if you are not able to sleep, or if you are sleeping too much      · Do not drink alcohol: Alcohol interacts with medicine used to treat schizophrenia  For support and more information:   · [employer] [facility]  [address] [facility]  , [address] [facility] , [address] [facility]  Phone: 1- 612 - [phone]  Phone: 3- 283 - [phone]  Web Address: http://www yang com/  org  · [address] [facility], Public Information & Communication Branch  [address], [address], [address]  [place] [address]   Phone: [phone]  Phone: 7- [phone]  Web Address: [facility] tn  Contact your healthcare provider or psychiatrist if:   · You feel that you are having symptoms of schizophrenia  · You are not able to sleep well, or are sleeping more than usual     · You cannot eat or are eating more than usual     · You have questions or concerns about your condition or care  Seek care immediately or call 911 if:   · You think about killing yourself or someone else  · You have a rash, swelling, or trouble breathing after you take your medicine  © 2017 [facility] [facility] Information is for End User's use only and may not be sold, redistributed or otherwise used for commercial purposes  All illustrations and images included in CareNotes® are the copyrighted property of [employer]  or Matthew Albarran  The above information is an  only  It is not intended as medical advice for individual conditions or treatments  Talk to your doctor, nurse or pharmacist before following any medical regimen to see if it is safe and effective for you

## 2019-10-28 ENCOUNTER — HOSPITAL ENCOUNTER (INPATIENT)
Facility: HOSPITAL | Age: 49
LOS: 8 days | Discharge: HOME | DRG: 885 | End: 2019-11-06
Attending: EMERGENCY MEDICINE | Admitting: PSYCHIATRY & NEUROLOGY
Payer: MEDICARE

## 2019-10-28 DIAGNOSIS — F20.9 SCHIZOPHRENIA (HCC): Primary | ICD-10-CM

## 2019-10-28 DIAGNOSIS — F50.9 EATING DISORDER: ICD-10-CM

## 2019-10-28 LAB
ALBUMIN SERPL BCP-MCNC: 3.9 G/DL (ref 3.5–5)
ALP SERPL-CCNC: 47 U/L (ref 46–116)
ALT SERPL W P-5'-P-CCNC: 25 U/L (ref 12–78)
AMPHETAMINES SERPL QL SCN: NEGATIVE
ANION GAP SERPL CALCULATED.3IONS-SCNC: 12 MMOL/L (ref 4–13)
ANION GAP SERPL CALCULATED.3IONS-SCNC: 17 MMOL/L (ref 4–13)
AST SERPL W P-5'-P-CCNC: 32 U/L (ref 5–45)
ATRIAL RATE: 63 BPM
BARBITURATES UR QL: NEGATIVE
BASOPHILS # BLD AUTO: 0.03 THOUSANDS/ΜL (ref 0–0.1)
BASOPHILS NFR BLD AUTO: 1 % (ref 0–1)
BENZODIAZ UR QL: NEGATIVE
BILIRUB SERPL-MCNC: 0.8 MG/DL (ref 0.2–1)
BUN SERPL-MCNC: 2 MG/DL (ref 5–25)
BUN SERPL-MCNC: 3 MG/DL (ref 5–25)
CALCIUM SERPL-MCNC: 7.8 MG/DL (ref 8.3–10.1)
CALCIUM SERPL-MCNC: 8.9 MG/DL (ref 8.3–10.1)
CHLORIDE SERPL-SCNC: 90 MMOL/L (ref 100–108)
CHLORIDE SERPL-SCNC: 98 MMOL/L (ref 100–108)
CLARITY, POC: CLEAR
CO2 SERPL-SCNC: 19 MMOL/L (ref 21–32)
CO2 SERPL-SCNC: 21 MMOL/L (ref 21–32)
COCAINE UR QL: NEGATIVE
COLOR, POC: CLEAR
CREAT SERPL-MCNC: 0.81 MG/DL (ref 0.6–1.3)
CREAT SERPL-MCNC: 0.81 MG/DL (ref 0.6–1.3)
EOSINOPHIL # BLD AUTO: 0.05 THOUSAND/ΜL (ref 0–0.61)
EOSINOPHIL NFR BLD AUTO: 1 % (ref 0–6)
ERYTHROCYTE [DISTWIDTH] IN BLOOD BY AUTOMATED COUNT: 13.6 % (ref 11.6–15.1)
ETHANOL SERPL-MCNC: <3 MG/DL (ref 0–3)
EXT BILIRUBIN, UA: NORMAL
EXT BLOOD URINE: NORMAL
EXT GLUCOSE, UA: NORMAL
EXT KETONES: 80
EXT NITRITE, UA: NORMAL
EXT PH, UA: 6
EXT PREG TEST URINE: NORMAL
EXT PROTEIN, UA: NORMAL
EXT SPECIFIC GRAVITY, UA: 1
EXT UROBILINOGEN: 0.2
EXT. CONTROL ED NAV: NORMAL
GFR SERPL CREATININE-BSD FRML MDRD: 86 ML/MIN/1.73SQ M
GFR SERPL CREATININE-BSD FRML MDRD: 86 ML/MIN/1.73SQ M
GLUCOSE SERPL-MCNC: 222 MG/DL (ref 65–140)
GLUCOSE SERPL-MCNC: 54 MG/DL (ref 65–140)
HCT VFR BLD AUTO: 34.8 % (ref 34.8–46.1)
HGB BLD-MCNC: 11.3 G/DL (ref 11.5–15.4)
IMM GRANULOCYTES # BLD AUTO: 0.05 THOUSAND/UL (ref 0–0.2)
IMM GRANULOCYTES NFR BLD AUTO: 1 % (ref 0–2)
LYMPHOCYTES # BLD AUTO: 1.16 THOUSANDS/ΜL (ref 0.6–4.47)
LYMPHOCYTES NFR BLD AUTO: 18 % (ref 14–44)
MCH RBC QN AUTO: 30.2 PG (ref 26.8–34.3)
MCHC RBC AUTO-ENTMCNC: 32.5 G/DL (ref 31.4–37.4)
MCV RBC AUTO: 93 FL (ref 82–98)
METHADONE UR QL: NEGATIVE
MONOCYTES # BLD AUTO: 0.35 THOUSAND/ΜL (ref 0.17–1.22)
MONOCYTES NFR BLD AUTO: 6 % (ref 4–12)
NEUTROPHILS # BLD AUTO: 4.78 THOUSANDS/ΜL (ref 1.85–7.62)
NEUTS SEG NFR BLD AUTO: 73 % (ref 43–75)
NRBC BLD AUTO-RTO: 0 /100 WBCS
OPIATES UR QL SCN: NEGATIVE
P AXIS: 62 DEGREES
PCP UR QL: NEGATIVE
PLATELET # BLD AUTO: 300 THOUSANDS/UL (ref 149–390)
PMV BLD AUTO: 9.9 FL (ref 8.9–12.7)
POTASSIUM SERPL-SCNC: 3.4 MMOL/L (ref 3.5–5.3)
POTASSIUM SERPL-SCNC: 3.4 MMOL/L (ref 3.5–5.3)
PR INTERVAL: 168 MS
PROT SERPL-MCNC: 7.4 G/DL (ref 6.4–8.2)
QRS AXIS: 51 DEGREES
QRSD INTERVAL: 98 MS
QT INTERVAL: 402 MS
QTC INTERVAL: 411 MS
RBC # BLD AUTO: 3.74 MILLION/UL (ref 3.81–5.12)
SODIUM SERPL-SCNC: 126 MMOL/L (ref 136–145)
SODIUM SERPL-SCNC: 131 MMOL/L (ref 136–145)
T WAVE AXIS: 44 DEGREES
THC UR QL: NEGATIVE
VENTRICULAR RATE: 63 BPM
WBC # BLD AUTO: 6.42 THOUSAND/UL (ref 4.31–10.16)
WBC # BLD EST: NORMAL 10*3/UL

## 2019-10-28 PROCEDURE — 93010 ELECTROCARDIOGRAM REPORT: CPT | Performed by: INTERNAL MEDICINE

## 2019-10-28 PROCEDURE — 93005 ELECTROCARDIOGRAM TRACING: CPT

## 2019-10-28 PROCEDURE — 99284 EMERGENCY DEPT VISIT MOD MDM: CPT | Performed by: EMERGENCY MEDICINE

## 2019-10-28 PROCEDURE — 96361 HYDRATE IV INFUSION ADD-ON: CPT

## 2019-10-28 PROCEDURE — 81025 URINE PREGNANCY TEST: CPT | Performed by: EMERGENCY MEDICINE

## 2019-10-28 PROCEDURE — 81002 URINALYSIS NONAUTO W/O SCOPE: CPT | Performed by: EMERGENCY MEDICINE

## 2019-10-28 PROCEDURE — 80307 DRUG TEST PRSMV CHEM ANLYZR: CPT | Performed by: EMERGENCY MEDICINE

## 2019-10-28 PROCEDURE — 80053 COMPREHEN METABOLIC PANEL: CPT | Performed by: EMERGENCY MEDICINE

## 2019-10-28 PROCEDURE — 80320 DRUG SCREEN QUANTALCOHOLS: CPT | Performed by: EMERGENCY MEDICINE

## 2019-10-28 PROCEDURE — 36415 COLL VENOUS BLD VENIPUNCTURE: CPT | Performed by: EMERGENCY MEDICINE

## 2019-10-28 PROCEDURE — 96360 HYDRATION IV INFUSION INIT: CPT

## 2019-10-28 PROCEDURE — 99285 EMERGENCY DEPT VISIT HI MDM: CPT

## 2019-10-28 PROCEDURE — 85025 COMPLETE CBC W/AUTO DIFF WBC: CPT | Performed by: EMERGENCY MEDICINE

## 2019-10-28 PROCEDURE — 80048 BASIC METABOLIC PNL TOTAL CA: CPT | Performed by: EMERGENCY MEDICINE

## 2019-10-28 RX ORDER — DEXTROSE AND SODIUM CHLORIDE 5; .9 G/100ML; G/100ML
500 INJECTION, SOLUTION INTRAVENOUS ONCE
Status: COMPLETED | OUTPATIENT
Start: 2019-10-28 | End: 2019-10-28

## 2019-10-28 RX ADMIN — DEXTROSE AND SODIUM CHLORIDE 500 ML/HR: 5; .9 INJECTION, SOLUTION INTRAVENOUS at 13:03

## 2019-10-28 RX ADMIN — SODIUM CHLORIDE 1000 ML: 0.9 INJECTION, SOLUTION INTRAVENOUS at 11:51

## 2019-10-28 NOTE — ED CARE HANDOFF
Emergency Department Sign Out Note        Sign out and transfer of care from Dr Kirby Gabriel  See Separate Emergency Department note  The patient, Ursula Lisa, was evaluated by the previous provider for eating disorder and inability to care for self  Workup Completed:  Patient medically cleared  Hyponatremia corrected by prior provider and patient signed 12 for voluntary psychiatric admission  Placement pending  ED Course / Workup Pending (followup): Patient monitored overnight  Transient decrease in blood pressure while sleeping which resolved without intervention  Patient consumed 2 Ensure drinks throughout the shift  Bed search to resume this morning  Procedures  MDM    Disposition  Final diagnoses:   Schizophrenia ([place] [address] )   Eating disorder     Time reflects when diagnosis was documented in both MDM as applicable and the Disposition within this note     Time User Action Codes Description Comment    10/28/2019  6:49 PM Andrew Davis Add [F20 9] Schizophrenia ([facility] )     10/28/2019  6:49 PM Mallory Lafleur Add [F50 9] Eating disorder       ED Disposition     None      MD Documentation      Most Recent Value   Sending MD Lafleur      Follow-up Information    None       Patient's Medications   Discharge Prescriptions    No medications on file     No discharge procedures on file         ED Provider  Electronically Signed by     Bailey Calzada MD  10/29/19 0146

## 2019-10-28 NOTE — ED PROVIDER NOTES
History  Chief Complaint   Patient presents with    Eating Disorder     pt presents to ER stating she hasnt eaten in 7 days and hasnt drank anything since yesterday, patient states she feels dehydrated  pt denies SI or HI, states she would like to be discharged to an [place] homeless shelter because theres a woman at [facility] where she lives who is always telling her when snacks and meals are available and she wants to eat when she feels ready not when shes told     Brought in from [facility] for evaluation  Reported hx of schizophrenia and eating disorder  Hasn't eaten x1wk - only drinking water  Not taking her meds  Wants to leave a go to a shelter  Had recent  302 admission for same (not eating, not taking meds, wanting to go to shelter) and was inpatient from 10/4-10/14  Pt denies any pain, denies n/v/d, no changes in urination  History provided by:  Patient and caregiver  History limited by:  Psychiatric disorder   used: No    Psychiatric Evaluation   Presenting symptoms comment:  Not taking medications and not eating  Patient accompanied by:  Caregiver  Degree of incapacity (severity):  Severe  Onset quality:  Gradual  Timing:  Constant  Progression:  Worsening  Chronicity:  Recurrent  Context: noncompliance    Treatment compliance:  Some of the time  Relieved by:  None tried  Worsened by:  Nothing  Ineffective treatments:  None tried  Associated symptoms: appetite change and poor judgment    Associated symptoms: no anhedonia, not distractible, no euphoric mood, no fatigue and no hypersomnia    Risk factors: hx of mental illness and recent psychiatric admission        Prior to Admission Medications   Prescriptions Last Dose Informant Patient Reported? Taking?    FLUoxetine (PROzac) 20 mg capsule Not Taking at Unknown time  No No   Sig: Take 1 capsule (20 mg total) by mouth daily At 9am   Patient not taking: Reported on 10/29/2019   OLANZapine (ZyPREXA) 10 mg tablet Not Taking at Unknown time  No No   Sig: Take 1 tablet (10 mg total) by mouth every morning At 9am   Patient not taking: Reported on 10/29/2019   OLANZapine (ZyPREXA) 15 mg tablet Not Taking at Unknown time  No No   Sig: Take 1 tablet (15 mg total) by mouth daily after dinner   Patient not taking: Reported on 10/29/2019   Omega-3 Fatty Acids (FISH OIL) 1,000 mg Not Taking at Unknown time  Yes No   Sig: Take 1,000 mg by mouth daily   cholecalciferol (VITAMIN D3) 1,000 units tablet Not Taking at Unknown time  Yes No   Sig: Take 1,000 Units by mouth daily   fluticasone (FLOVENT DISKUS) 50 MCG/BLIST diskus inhaler Not Taking at Unknown time  Yes No   Sig: Inhale 1 puff 2 (two) times a day   folic acid (FOLVITE) 1 mg tablet Not Taking at Unknown time  Yes No   Sig: Take by mouth daily   loratadine (CLARITIN) 10 mg tablet Not Taking at Unknown time  Yes No   Sig: Take 10 mg by mouth daily   magnesium hydroxide (MILK OF MAGNESIA) 400 mg/5 mL oral suspension Not Taking at Unknown time  Yes No   Sig: Take by mouth daily as needed for constipation   polyethylene glycol (MIRALAX) 17 g packet Not Taking at Unknown time  Yes No   Sig: Take 17 g by mouth daily      Facility-Administered Medications: None       Past Medical History:   Diagnosis Date    Eating disorder     Hyponatremia     Low blood pressure     Schizophrenia (HCC)     Seizures (HCC)        Past Surgical History:   Procedure Laterality Date    CHOLECYSTECTOMY      DILATION AND CURETTAGE OF UTERUS         History reviewed  No pertinent family history  I have reviewed and agree with the history as documented  Social History     Tobacco Use    Smoking status: Former Smoker    Smokeless tobacco: Never Used   Substance Use Topics    Alcohol use: No     Frequency: Never     Binge frequency: Never    Drug use: No        Review of Systems   Unable to perform ROS: Psychiatric disorder   Constitutional: Positive for appetite change   Negative for fatigue  Physical Exam  Physical Exam   Constitutional: She is oriented to person, place, and time  She appears well-developed and well-nourished  HENT:   Nose: Nose normal    Mouth/Throat: Oropharynx is clear and moist    Eyes: Conjunctivae are normal    Neck: Neck supple  Cardiovascular: Normal rate and regular rhythm  Pulmonary/Chest: Effort normal and breath sounds normal    Abdominal: Soft  There is no tenderness  Musculoskeletal: She exhibits no deformity  Neurological: She is alert and oriented to person, place, and time  Skin: Skin is warm  Psychiatric: Her speech is normal  Her affect is labile  She is agitated  She is not actively hallucinating  Thought content is delusional  Cognition and memory are normal  She expresses inappropriate judgment  She is attentive  Nursing note and vitals reviewed        Vital Signs  ED Triage Vitals   Temperature Pulse Respirations Blood Pressure SpO2   10/28/19 1135 10/28/19 1135 10/28/19 1135 10/28/19 1135 10/28/19 1135   (!) 96 9 °F (36 1 °C) 87 19 120/67 96 %      Temp Source Heart Rate Source Patient Position - Orthostatic VS BP Location FiO2 (%)   10/28/19 2005 10/28/19 1135 10/28/19 1135 10/28/19 1135 --   Temporal Monitor Lying Right arm       Pain Score       --                  Vitals:    10/29/19 0430 10/29/19 0500 10/29/19 0612 10/29/19 0700   BP: 102/67 96/65 96/57 101/73   Pulse:   84    Patient Position - Orthostatic VS:   Lying          Visual Acuity      ED Medications  Medications   hydrOXYzine HCL (ATARAX) tablet 25 mg (has no administration in time range)   LORazepam (ATIVAN) tablet 1 mg (has no administration in time range)   LORazepam (ATIVAN) 2 mg/mL injection 2 mg (has no administration in time range)   traZODone (DESYREL) tablet 50 mg (50 mg Oral Given 10/29/19 2102)   risperiDONE (RisperDAL M-TABS) dispersible tablet 1 mg (has no administration in time range)   OLANZapine (ZyPREXA) tablet 10 mg (has no administration in time range)   OLANZapine (ZyPREXA) IM injection 10 mg (has no administration in time range)   aluminum-magnesium hydroxide-simethicone (MYLANTA) 200-200-20 mg/5 mL oral suspension 30 mL (has no administration in time range)   benztropine (COGENTIN) tablet 1 mg (has no administration in time range)   benztropine (COGENTIN) injection 1 mg (has no administration in time range)   acetaminophen (TYLENOL) tablet 325 mg (has no administration in time range)   acetaminophen (TYLENOL) tablet 650 mg (has no administration in time range)   acetaminophen (TYLENOL) tablet 975 mg (has no administration in time range)   hydrOXYzine HCL (ATARAX) tablet 50 mg (has no administration in time range)   OLANZapine (ZyPREXA ZYDIS) dispersible tablet 5 mg (5 mg Oral Given 10/31/19 0937)   OLANZapine (ZyPREXA ZYDIS) dispersible tablet 10 mg (10 mg Oral Given 10/30/19 2108)   docusate sodium (COLACE) capsule 100 mg (has no administration in time range)   sodium chloride 0 9 % bolus 1,000 mL (0 mL Intravenous Stopped 10/28/19 1300)   dextrose 5 % and sodium chloride 0 9 % infusion (0 mL/hr Intravenous Stopped 10/28/19 1511)       Diagnostic Studies  Results Reviewed     Procedure Component Value Units Date/Time    Basic metabolic panel [[id number]]  (Abnormal) Collected:  10/28/19 1511    Lab Status:  Final result Specimen:  Blood from Arm, Left Updated:  10/28/19 1535     Sodium 131 mmol/L      Potassium 3 4 mmol/L      Chloride 98 mmol/L      CO2 21 mmol/L      ANION GAP 12 mmol/L      BUN 2 mg/dL      Creatinine 0 81 mg/dL      Glucose 222 mg/dL      Calcium 7 8 mg/dL      eGFR 86 ml/min/1 73sq m     Narrative:       Larry guidelines for Chronic Kidney Disease (CKD):     Stage 1 with normal or high GFR (GFR > 90 mL/min/1 73 square meters)    Stage 2 Mild CKD (GFR = 60-89 mL/min/1 73 square meters)    Stage 3A Moderate CKD (GFR = 45-59 mL/min/1 73 square meters)    Stage 3B Moderate CKD (GFR = 30-44 mL/min/1 73 square meters)    Stage 4 Severe CKD (GFR = 15-29 mL/min/1 73 square meters)    Stage 5 End Stage CKD (GFR <15 mL/min/1 73 square meters)  Note: GFR calculation is accurate only with a steady state creatinine    Comprehensive metabolic panel [[id number]]  (Abnormal) Collected:  10/28/19 1151    Lab Status:  Final result Specimen:  Blood from Arm, Left Updated:  10/28/19 1232     Sodium 126 mmol/L      Potassium 3 4 mmol/L      Chloride 90 mmol/L      CO2 19 mmol/L      ANION GAP 17 mmol/L      BUN 3 mg/dL      Creatinine 0 81 mg/dL      Glucose 54 mg/dL      Calcium 8 9 mg/dL      AST 32 U/L      ALT 25 U/L      Alkaline Phosphatase 47 U/L      Total Protein 7 4 g/dL      Albumin 3 9 g/dL      Total Bilirubin 0 80 mg/dL      eGFR 86 ml/min/1 73sq m     Narrative:       Larry guidelines for Chronic Kidney Disease (CKD):     Stage 1 with normal or high GFR (GFR > 90 mL/min/1 73 square meters)    Stage 2 Mild CKD (GFR = 60-89 mL/min/1 73 square meters)    Stage 3A Moderate CKD (GFR = 45-59 mL/min/1 73 square meters)    Stage 3B Moderate CKD (GFR = 30-44 mL/min/1 73 square meters)    Stage 4 Severe CKD (GFR = 15-29 mL/min/1 73 square meters)    Stage 5 End Stage CKD (GFR <15 mL/min/1 73 square meters)  Note: GFR calculation is accurate only with a steady state creatinine    Ethanol [[id number]]  (Normal) Collected:  10/28/19 1151    Lab Status:  Final result Specimen:  Blood from Arm, Left Updated:  10/28/19 1229     Ethanol Lvl <3 mg/dL     Rapid drug screen, urine [[id number]]  (Normal) Collected:  10/28/19 1202    Lab Status:  Final result Specimen:  Urine, Clean Catch Updated:  10/28/19 1221     Amph/Meth UR Negative     Barbiturate Ur Negative     Benzodiazepine Urine Negative     Cocaine Urine Negative     Methadone Urine Negative     Opiate Urine Negative     PCP Ur Negative     THC Urine Negative    Narrative:       FOR MEDICAL PURPOSES ONLY     IF CONFIRMATION NEEDED PLEASE CONTACT THE LAB WITHIN 5 DAYS      Drug Screen Cutoff Levels:  AMPHETAMINE/METHAMPHETAMINES  1000 ng/mL  BARBITURATES     200 ng/mL  BENZODIAZEPINES     200 ng/mL  COCAINE      300 ng/mL  METHADONE      300 ng/mL  OPIATES      300 ng/mL  PHENCYCLIDINE     25 ng/mL  THC       50 ng/mL      CBC and differential [[id number]]  (Abnormal) Collected:  10/28/19 1151    Lab Status:  Final result Specimen:  Blood from Arm, Left Updated:  10/28/19 1213     WBC 6 42 Thousand/uL      RBC 3 74 Million/uL      Hemoglobin 11 3 g/dL      Hematocrit 34 8 %      MCV 93 fL      MCH 30 2 pg      MCHC 32 5 g/dL      RDW 13 6 %      MPV 9 9 fL      Platelets 313 Thousands/uL      nRBC 0 /100 WBCs      Neutrophils Relative 73 %      Immat GRANS % 1 %      Lymphocytes Relative 18 %      Monocytes Relative 6 %      Eosinophils Relative 1 %      Basophils Relative 1 %      Neutrophils Absolute 4 78 Thousands/µL      Immature Grans Absolute 0 05 Thousand/uL      Lymphocytes Absolute 1 16 Thousands/µL      Monocytes Absolute 0 35 Thousand/µL      Eosinophils Absolute 0 05 Thousand/µL      Basophils Absolute 0 03 Thousands/µL     POCT pregnancy, urine [[id number]]  (Normal) Resulted:  10/28/19 1204    Lab Status:  Final result Updated:  10/28/19 1204     EXT PREG TEST UR (Ref: Negative) NEG     Control VALID    POCT urinalysis dipstick [[id number]]  (Normal) Resulted:  10/28/19 1201    Lab Status:  Final result Specimen:  Urine Updated:  10/28/19 1201     Color, UA CLEAR     Clarity, UA CLEAR     Glucose, UA (Ref: Negative) NEG     Bilirubin, UA (Ref: Negative) NEG     Ketones, UA (Ref: Negative) 80     Spec Grav, UA (Ref:1 003-1 030) 1 005     Blood, UA (Ref: Negative) NEG     pH, UA (Ref: 4 5-8 0) 6 0     Protein, UA (Ref: Negative) NEG     Urobilinogen, UA (Ref: 0 2- 1 0) 0 2      Leukocytes, UA (Ref: Negative) MOD     Nitrite, UA (Ref: Negative) NEG                 No orders to display              Procedures  ECG 12 Lead Documentation Only  Date/Time: 10/28/2019 11:58 AM  Performed by: Lauren Tristan DO  Authorized by: Lauren Tristan DO     ECG reviewed by me, the ED Provider: yes    Patient location:  ED  Previous ECG:     Previous ECG:  Compared to current    Similarity:  No change  Interpretation:     Interpretation: normal    Rate:     ECG rate:  63    ECG rate assessment: normal    Rhythm:     Rhythm: sinus rhythm    Ectopy:     Ectopy: none    QRS:     QRS axis:  Normal  ST segments:     ST segments:  Non-specific  T waves:     T waves: non-specific             ED Course  ED Course as of Oct 31 1035   Mon Oct 28, 2019   1238 Pt asymptomatic at this time  Glucose, Random(!): 54   1239 Low compared to previous levels  Pt does note drinking 'a lot' of water today - feel some element of psychogenic polydipsia (asking for water in the ED)  Sodium(!): 126   1542 Improved after fluids  Now medically cleared for Crisis eval   Sodium(!): 131   1658 201 signed, bed search in progress      1900 Care transferred to Dr Jessica Strauss  No beds available   Will resume bed search in the am                                  MDM  Number of Diagnoses or Management Options  Eating disorder: new and requires workup  Schizophrenia [facility]): new and requires workup     Amount and/or Complexity of Data Reviewed  Clinical lab tests: reviewed and ordered  Tests in the medicine section of CPT®: reviewed and ordered  Decide to obtain previous medical records or to obtain history from someone other than the patient: yes  Obtain history from someone other than the patient: yes  Independent visualization of images, tracings, or specimens: yes        Disposition  Final diagnoses:   Schizophrenia ([facility] 75 )   Eating disorder     Time reflects when diagnosis was documented in both MDM as applicable and the Disposition within this note     Time User Action Codes Description Comment    10/28/2019  6:49 PM Constantin Matute Add [F20 9] Schizophrenia ([facility] [address] )     10/28/2019  6:49 PM Deana Romero Add [F50 9] Eating disorder       ED Disposition     ED Disposition Condition Date/Time Comment    Transfer to [address] [place] [address] Oct 29, 2019 11:26 AM Donita Buitrago should be transferred out to [facility] and has been medically cleared  MD Documentation      Most Recent Value   Sending MD Lafleur      Follow-up Information    None         Current Discharge Medication List      CONTINUE these medications which have NOT CHANGED    Details   cholecalciferol (VITAMIN D3) 1,000 units tablet Take 1,000 Units by mouth daily      FLUoxetine (PROzac) 20 mg capsule Take 1 capsule (20 mg total) by mouth daily At 9am  Qty: 14 capsule, Refills: 0    Associated Diagnoses: Schizophrenia (HCC)      fluticasone (FLOVENT DISKUS) 50 MCG/BLIST diskus inhaler Inhale 1 puff 2 (two) times a day      folic acid (FOLVITE) 1 mg tablet Take by mouth daily      loratadine (CLARITIN) 10 mg tablet Take 10 mg by mouth daily      magnesium hydroxide (MILK OF MAGNESIA) 400 mg/5 mL oral suspension Take by mouth daily as needed for constipation      !! OLANZapine (ZyPREXA) 10 mg tablet Take 1 tablet (10 mg total) by mouth every morning At 9am  Qty: 14 tablet, Refills: 0    Associated Diagnoses: Schizophrenia ([address] )      ! ! OLANZapine (ZyPREXA) 15 mg tablet Take 1 tablet (15 mg total) by mouth daily after dinner  Qty: 14 tablet, Refills: 0    Associated Diagnoses: Schizophrenia (HCC)      Omega-3 Fatty Acids (FISH OIL) 1,000 mg Take 1,000 mg by mouth daily      polyethylene glycol (MIRALAX) 17 g packet Take 17 g by mouth daily       ! ! - Potential duplicate medications found  Please discuss with provider  No discharge procedures on file      ED Provider  Electronically Signed by           Jaclyn Lake DO  10/31/19 3429

## 2019-10-28 NOTE — ED NOTES
Pt signed 279, changed into paper scrubs, all belongings and bags collected and placed into locker #87        Ganesh Lee RN  10/28/19 7205

## 2019-10-28 NOTE — ED NOTES
Patient denies current complaints  Caregiver remains at bedside  Will continue to monitor        Fransisca Corey RN  10/28/19 0448

## 2019-10-28 NOTE — ED NOTES
Patient resting in bed with caregiver at bedside  Will continue to monitor        Herminia Lloyd RN  10/28/19 9277

## 2019-10-29 RX ORDER — OLANZAPINE 10 MG/1
10 TABLET ORAL EVERY 8 HOURS PRN
Status: DISCONTINUED | OUTPATIENT
Start: 2019-10-29 | End: 2019-11-06 | Stop reason: HOSPADM

## 2019-10-29 RX ORDER — ACETAMINOPHEN 325 MG/1
350 TABLET ORAL EVERY 6 HOURS PRN
Status: DISCONTINUED | OUTPATIENT
Start: 2019-10-29 | End: 2019-11-06 | Stop reason: HOSPADM

## 2019-10-29 RX ORDER — OLANZAPINE 10 MG/1
10 INJECTION, POWDER, LYOPHILIZED, FOR SOLUTION INTRAMUSCULAR EVERY 8 HOURS PRN
Status: DISCONTINUED | OUTPATIENT
Start: 2019-10-29 | End: 2019-11-06 | Stop reason: HOSPADM

## 2019-10-29 RX ORDER — ACETAMINOPHEN 325 MG/1
650 TABLET ORAL EVERY 4 HOURS PRN
Status: DISCONTINUED | OUTPATIENT
Start: 2019-10-29 | End: 2019-11-06 | Stop reason: HOSPADM

## 2019-10-29 RX ORDER — LORAZEPAM 1 MG/1
1 TABLET ORAL EVERY 6 HOURS PRN
Status: DISCONTINUED | OUTPATIENT
Start: 2019-10-29 | End: 2019-11-06 | Stop reason: HOSPADM

## 2019-10-29 RX ORDER — BENZTROPINE MESYLATE 1 MG/ML
1 INJECTION INTRAMUSCULAR; INTRAVENOUS EVERY 6 HOURS PRN
Status: DISCONTINUED | OUTPATIENT
Start: 2019-10-29 | End: 2019-11-06 | Stop reason: HOSPADM

## 2019-10-29 RX ORDER — TRAZODONE HYDROCHLORIDE 50 MG/1
50 TABLET ORAL
Status: DISCONTINUED | OUTPATIENT
Start: 2019-10-29 | End: 2019-11-06 | Stop reason: HOSPADM

## 2019-10-29 RX ORDER — HYDROXYZINE HYDROCHLORIDE 25 MG/1
25 TABLET, FILM COATED ORAL EVERY 6 HOURS PRN
Status: DISCONTINUED | OUTPATIENT
Start: 2019-10-29 | End: 2019-11-06 | Stop reason: HOSPADM

## 2019-10-29 RX ORDER — BENZTROPINE MESYLATE 1 MG/1
1 TABLET ORAL EVERY 6 HOURS PRN
Status: DISCONTINUED | OUTPATIENT
Start: 2019-10-29 | End: 2019-11-06 | Stop reason: HOSPADM

## 2019-10-29 RX ORDER — RISPERIDONE 1 MG/1
1 TABLET, ORALLY DISINTEGRATING ORAL
Status: DISCONTINUED | OUTPATIENT
Start: 2019-10-29 | End: 2019-11-06 | Stop reason: HOSPADM

## 2019-10-29 RX ORDER — MAGNESIUM HYDROXIDE/ALUMINUM HYDROXICE/SIMETHICONE 120; 1200; 1200 MG/30ML; MG/30ML; MG/30ML
30 SUSPENSION ORAL EVERY 4 HOURS PRN
Status: DISCONTINUED | OUTPATIENT
Start: 2019-10-29 | End: 2019-11-06 | Stop reason: HOSPADM

## 2019-10-29 RX ORDER — ACETAMINOPHEN 325 MG/1
975 TABLET ORAL EVERY 6 HOURS PRN
Status: DISCONTINUED | OUTPATIENT
Start: 2019-10-29 | End: 2019-11-06 | Stop reason: HOSPADM

## 2019-10-29 RX ORDER — HYDROXYZINE 50 MG/1
50 TABLET, FILM COATED ORAL EVERY 6 HOURS PRN
Status: DISCONTINUED | OUTPATIENT
Start: 2019-10-29 | End: 2019-11-06 | Stop reason: HOSPADM

## 2019-10-29 RX ORDER — LORAZEPAM 2 MG/ML
2 INJECTION INTRAMUSCULAR EVERY 6 HOURS PRN
Status: DISCONTINUED | OUTPATIENT
Start: 2019-10-29 | End: 2019-11-06 | Stop reason: HOSPADM

## 2019-10-29 RX ADMIN — TRAZODONE HYDROCHLORIDE 50 MG: 50 TABLET ORAL at 21:02

## 2019-10-29 RX ADMIN — MAGNESIUM HYDROXIDE 30 ML: 400 SUSPENSION ORAL at 17:58

## 2019-10-29 NOTE — ED NOTES
Patient is accepted at [facility]  Patient is accepted by Ellis Dakins, PA-C  per Li Montano at intake         Nurse report is to be called to [phone] prior to patient transfer
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
## 2019-10-29 NOTE — ED NOTES
Insurance Authorization for admission:   Phone call placed to [facility]  Phone number: [phone] to [place]   Level of care: 201  Inpatient admission  Review on: COB     upon discharge please contact [facility] at [phone] to alert of discharge      EVS (Eligibility Verification System) called - [phone]    Automated system indicates: active with [address] [place] [address]

## 2019-10-29 NOTE — TREATMENT PLAN
RN will assess patient at least twice daily and educate patient on medications, diagnoses, and coping skills

## 2019-10-29 NOTE — ED NOTES
Pt requested ensure   Dietary called, request put in for chocolate, vanilla and strawberry ensure     Doris Chou RN  10/29/19 2055

## 2019-10-29 NOTE — ED NOTES
Crisis called, nurse asked for update on placement for patient   Will await update after morning crisis worker arrives      Chika Nunezan, [facility]  10/29/19 5934

## 2019-10-29 NOTE — ED NOTES
Pt ambulated to bathroom without issue  Denies needing anything new at this time        Robbie Rebolledo, DREAD  10/29/19 5561

## 2019-10-29 NOTE — ED NOTES
Patient informed that staff was not able to find placement for her tonight and Crisis will be check for placement in the morning  Patient informed she will be staying in the ED tonight  Patient had no questions and asked for tap water       Markos Hameed RN  10/28/19 Luis M [address] Collette Moore RN  10/28/19 8639

## 2019-10-29 NOTE — PROGRESS NOTES
Pt seclusive to room  Pt said she hasn't slept in days and is tired  Pt requesting order for ensure and stool softener  Less irritable this evening  Continues to refuse vitals  Pt denies SI/HI  Denies depression and anxiety

## 2019-10-29 NOTE — PROGRESS NOTES
With Pt: 3 shirts, 3 bottoms, 4 socks, 2 bras, 3 panties, brush, Deoderant, and thermal shirt  In Locker: [facility] containing shirts, panties, thermal,  socks, shorts, tank tops, coat, hoodie wallet, coat (insurance cards, access card)

## 2019-10-30 PROCEDURE — 99221 1ST HOSP IP/OBS SF/LOW 40: CPT | Performed by: PSYCHIATRY & NEUROLOGY

## 2019-10-30 PROCEDURE — 99231 SBSQ HOSP IP/OBS SF/LOW 25: CPT | Performed by: PHYSICIAN ASSISTANT

## 2019-10-30 RX ORDER — OLANZAPINE 10 MG/1
10 TABLET, ORALLY DISINTEGRATING ORAL
Status: DISCONTINUED | OUTPATIENT
Start: 2019-10-30 | End: 2019-11-03

## 2019-10-30 RX ORDER — DOCUSATE SODIUM 100 MG/1
100 CAPSULE, LIQUID FILLED ORAL 2 TIMES DAILY PRN
Status: DISCONTINUED | OUTPATIENT
Start: 2019-10-30 | End: 2019-11-06 | Stop reason: HOSPADM

## 2019-10-30 RX ORDER — OLANZAPINE 5 MG/1
5 TABLET, ORALLY DISINTEGRATING ORAL DAILY
Status: DISCONTINUED | OUTPATIENT
Start: 2019-10-30 | End: 2019-11-06 | Stop reason: HOSPADM

## 2019-10-30 RX ADMIN — OLANZAPINE 5 MG: 5 TABLET, ORALLY DISINTEGRATING ORAL at 09:32

## 2019-10-30 RX ADMIN — OLANZAPINE 10 MG: 10 TABLET, ORALLY DISINTEGRATING ORAL at 21:08

## 2019-10-30 NOTE — TREATMENT PLAN
TREATMENT PLAN REVIEW - [address] 51 y o  1970 female MRN: [id number]    [address] [place] [address] Room / Bed: Olinda Sneed [facility] [id number] Encounter: [id number]        Admit Date/Time:  10/28/2019 11:23 AM    Treatment Team: Attending Provider: Estefani Conklin; : Valerie Pruett; Charge Nurse: Juan José Barraza, RN; Patient Care Technician: Avni Hurtado; Patient Care Assistant: Eboni Dugan; Registered Nurse: Andres Ding, RN; Care Manager: Snow De Paz, RN; Patient Care Technician: Janiya Reyes; Patient Care Technician: Bran Zayas; Registered Nurse: Carlos [place] RN; Occupational Therapy Assistant: IRASEMA Gregory;  Registered Nurse: Mirtha Steen RN    Diagnosis: Principal Problem:    Schizophrenia ([facility] [address] )  Active Problems:    Eating disorder    Patient Strengths/Assets: good past treatment response, patient is on a voluntary commitment      Patient Barriers/Limitations: low self esteem, noncompliant with medication    Short Term Goals: decrease in depressive symptoms, decrease in anxiety symptoms, decrease in suicidal thoughts, ability to stay safe on the unit, improvement in appetite    Long Term Goals: improvement in depression, improvement in anxiety, stabilization of mood, free of suicidal thoughts, acceptance of need for psychiatric medications, acceptance of need for psychiatric treatment, acceptance of need for psychiatric follow up after discharge    Progress Towards Goals: starting psychiatric medications as prescribed    Recommended Treatment: medication management, patient medication education, group therapy, milieu therapy, continued Behavioral Health psychiatric evaluation/assessment process     Treatment Frequency: daily medication monitoring, group and milieu therapy daily, monitoring through interdisciplinary rounds, monitoring through weekly patient care conferences    Expected Discharge Date: 7 days - 11/6/2019    Discharge Plan: referral for outpatient medication management with a psychiatrist, referral for outpatient psychotherapy    Treatment Plan Created/Updated By: Zev Galan

## 2019-10-30 NOTE — PROGRESS NOTES
Patient seen and examined  Recently discharged from [facility] after 302 admission from [facility] med/surg unit  Patient was initially hospitalized with hyponatremia and hypokalemia treated with IV and oral supplementation at that time  She has a history of anorexia and schizophrenia with medication non-compliance  She was brought into the ER this admission from [facility] after not eating or drinking for a week  She was found to be hyponatremic, hypokalemic, and hypochloremic in the ER and was treated with IVF  Her sodium on 10/28 was 131 and potassium was 3 4 at which point she was transferred to [facility]  She now refuses all blood work and vitals  She reports she is very unlikely to become agreeable to blood work  We discussed the risk of potentially fatal cardiac arrhythmias if her electrolytes continue to be low  She continues to decline blood work at this time  She reports she is drinking very small quantities of ensure and "a lot of water" Patient respectfully disagrees that she could have series repercussions from electrolyte abnormalities  Recommend repeat BMP to monitor sodium and potassium, discussed with Yadira Houston RN who will continue to encourage patient to get blood work  Encourage ensure and food intake  Would not recommend laxatives with electrolyte abnormalities and history of anorexia  Would recommend colace     Medically cleared for inpatient psychiatric evaluation and treatment

## 2019-10-30 NOTE — CASE MANAGEMENT
CM outreached to [facility] and spoke to Carmell Leventhal regarding pt's admission  Carmell Leventhal reports that they have been trying to encourage pt to take long acting injectable  Carmell Leventhal reports that pt does well when she has time to think things through and make a decision  CM advised Carmell Leventhal that this writer will update [facility] when [employer] date is known

## 2019-10-30 NOTE — MALNUTRITION/BMI
This medical record reflects one or more clinical indicators suggestive of malnutrition and/or morbid obesity  Malnutrition Findings:   Malnutrition type: Chronic illness(Pt presents with severe protein calorie malnutrition as evidenced by 30% wt loss in 9 months( 1/23/19 86 2 kg, 10/29/19 60 9 kg) and < 75% po intake > 1 month  Treat with diet and supplements  TID  )   Pt to benefit from inpatient eating disorder facility  Degree of Malnutrition: Other severe protein calorie malnutrition  Malnutrition Characteristics: Inadequate energy, Weight loss    BMI Findings: Body mass index is 22 33 kg/m²  See Nutrition note dated 10/30/19 for additional details  Completed nutrition assessment is viewable in the nutrition documentation

## 2019-10-30 NOTE — PROGRESS NOTES
Pt ademently refusing labwork despite discussing potential health problems related to abnormal electrolytes  Pt says she is fine if she goes back to medical and that she will refuse lab work there  Reports going longer than 1 week without eating and didn't have any problems then  Says she relies in the lord and doesn't need "that stuff" pointing to the lab draw kit  Pt states she is waiting for her Ensure to become "room temperature" before drinking it
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
## 2019-10-30 NOTE — PLAN OF CARE
Problem: PSYCHOSIS  Goal: Will report no hallucinations or delusions  Description  Interventions:  - Administer medication as  ordered  - Every waking shifts and PRN assess for the presence of hallucinations and or delusions  - Assist with reality testing to support increasing orientation  - Assess if patient's hallucinations or delusions are encouraging self-harm or harm to others and intervene as appropriate  Outcome: Progressing     Problem: SELF CARE DEFICIT  Goal: Return ADL status to a safe level of function  Description  INTERVENTIONS:  - Administer medication as ordered  - Assess ADL deficits and provide assistive devices as needed  - Obtain PT/OT consults as needed  - Assist and instruct patient to increase activity and self care as tolerated  Outcome: Progressing     Problem: SELF HARM/SUICIDALITY  Goal: Will have no self-injury during hospital stay  Description  INTERVENTIONS:  - Q 15 MINUTES: Routine safety checks  - Q WAKING SHIFT & PRN: Assess risk to determine if routine checks are adequate to maintain patient safety  - Encourage patient to participate actively in care by formulating a plan to combat response to suicidal ideation, identify supports and resources  Outcome: Progressing     Problem: Nutrition/Hydration-ADULT  Goal: Nutrient/Hydration intake appropriate for improving, restoring or maintaining nutritional needs  Description  Monitor and assess patient's nutrition/hydration status for malnutrition  Collaborate with interdisciplinary team and initiate plan and interventions as ordered  Monitor patient's weight and dietary intake as ordered or per policy  Utilize nutrition screening tool and intervene as necessary  Determine patient's food preferences and provide high-protein, high-caloric foods as appropriate       INTERVENTIONS:  - Monitor oral intake, urinary output, labs, and treatment plans  - Assess nutrition and hydration status and recommend course of action  - Evaluate amount of meals eaten  - Assist patient with eating if necessary   - Allow adequate time for meals  - Recommend/ encourage appropriate diets, oral nutritional supplements, and vitamin/mineral supplements  - Order, calculate, and assess calorie counts as needed  - Recommend, monitor, and adjust tube feedings and TPN/PPN based on assessed needs  - Assess need for intravenous fluids  - Provide specific nutrition/hydration education as appropriate  - Include patient/family/caregiver in decisions related to nutrition  Outcome: Progressing
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
## 2019-10-30 NOTE — CASE MANAGEMENT
CM outreached to pt's mother to advise of dc and discuss admission goals  Mother is guardian and this writer made her aware of admission  CM will continue to be in contact with mother

## 2019-10-31 PROCEDURE — 99231 SBSQ HOSP IP/OBS SF/LOW 25: CPT | Performed by: PSYCHIATRY & NEUROLOGY

## 2019-10-31 RX ADMIN — OLANZAPINE 10 MG: 10 TABLET, ORALLY DISINTEGRATING ORAL at 21:40

## 2019-10-31 RX ADMIN — OLANZAPINE 5 MG: 5 TABLET, ORALLY DISINTEGRATING ORAL at 09:37

## 2019-10-31 NOTE — PROGRESS NOTES
Remains seclusive to room, appetite poor still thinks ensure is good enough instead of food denies SI

## 2019-10-31 NOTE — PROGRESS NOTES
Status: Pt is drinking her Ensure  Yesterday Pt refused vitals & this morning she refused labs  She did shower & slept overnight  She is stating she wants to go to a shelter  She continues to refuse [employer]    Medication: Zyprexa 5mg in the morning & 10mg at bedtime / no PRNs  D/C: TBD - next week     10/31/19 0712   Team Meeting   Meeting Type Daily Rounds   Team Members Present   Team Members Present Physician;Nurse;;Occupational Therapist   Physician Team Member Dr Oanh Law / Chante Dial Team Member NAHID [facility] Management Team Member Faye Israel    OT Team Member Aide   Patient/Family Present   Patient Present No   Patient's Family Present No

## 2019-10-31 NOTE — PROGRESS NOTES
Pt with pressured speech  States anxiety and depression are a 0  She denies S/H/I or AVH  She shared that she weighed 200 lbs in the past and since losing the weight, rarely has an appetite and when she is ready to eat, she will eat  Pt has been drinking Ensure and eating hermes [employer] prn  She has been compliant with meds and doesn't attend groups  Will continue to monitor, provide support and encouragement

## 2019-10-31 NOTE — PLAN OF CARE
Problem: PSYCHOSIS  Goal: Will report no hallucinations or delusions  Description  Interventions:  - Administer medication as  ordered  - Every waking shifts and PRN assess for the presence of hallucinations and or delusions  - Assist with reality testing to support increasing orientation  - Assess if patient's hallucinations or delusions are encouraging self-harm or harm to others and intervene as appropriate  Outcome: Progressing     Problem: SELF CARE DEFICIT  Goal: Return ADL status to a safe level of function  Description  INTERVENTIONS:  - Administer medication as ordered  - Assess ADL deficits and provide assistive devices as needed  - Obtain PT/OT consults as needed  - Assist and instruct patient to increase activity and self care as tolerated  Outcome: Progressing     Problem: SELF HARM/SUICIDALITY  Goal: Will have no self-injury during hospital stay  Description  INTERVENTIONS:  - Q 15 MINUTES: Routine safety checks  - Q WAKING SHIFT & PRN: Assess risk to determine if routine checks are adequate to maintain patient safety  - Encourage patient to participate actively in care by formulating a plan to combat response to suicidal ideation, identify supports and resources  Outcome: Progressing     Problem: Nutrition/Hydration-ADULT  Goal: Nutrient/Hydration intake appropriate for improving, restoring or maintaining nutritional needs  Description  Monitor and assess patient's nutrition/hydration status for malnutrition  Collaborate with interdisciplinary team and initiate plan and interventions as ordered  Monitor patient's weight and dietary intake as ordered or per policy  Utilize nutrition screening tool and intervene as necessary  Determine patient's food preferences and provide high-protein, high-caloric foods as appropriate       INTERVENTIONS:  - Monitor oral intake, urinary output, labs, and treatment plans  - Assess nutrition and hydration status and recommend course of action  - Evaluate amount of meals eaten  - Assist patient with eating if necessary   - Allow adequate time for meals  - Recommend/ encourage appropriate diets, oral nutritional supplements, and vitamin/mineral supplements  - Order, calculate, and assess calorie counts as needed  - Recommend, monitor, and adjust tube feedings and TPN/PPN based on assessed needs  - Assess need for intravenous fluids  - Provide specific nutrition/hydration education as appropriate  - Include patient/family/caregiver in decisions related to nutrition  Outcome: Progressing     Problem: Ineffective Coping  Goal: Participates in unit activities  Description  Interventions:  - Provide therapeutic environment   - Provide required programming   - Redirect inappropriate behaviors   Outcome: Progressing

## 2019-10-31 NOTE — PROGRESS NOTES
Progress Note - Behavioral Health   Lynda Malin 51 y o  female MRN: [id number]  Unit/Bed#: [facility] 254-02 Encounter: [id number]    Assessment/Plan   Principal Problem:    Schizophrenia ([address] )  Active Problems:    Eating disorder    Subjective:  Patient is compliant with Zyprexa but continues to refuse getting lab work and vitals  Patient remained child-like but less irritable than yesterday  Patient continued to refuse that she will agree for lab work for vitals but continues to perseverate over the fact that we should give her some time and she was started eating when she starts to feel like that  Patient continues to verbalize not hearing voices and continues to question the diagnosis from psychiatric standpoint  Patient continues to perseverate over not going back but wanting to go back to shelter on discharge  She do not want to speak with her mother and expressing anger against her for keeping her in the hospital   Patient does report feeling safe on the unit but has remained seclusive and was encouraged to participate in milieu therapy      Current Medications:    Current Facility-Administered Medications:  acetaminophen 325 mg Oral Q6H PRN Omega Wallace, PA-C   acetaminophen 650 mg Oral Q4H PRN Omega Wallace, PA-C   acetaminophen 975 mg Oral Q6H PRN Omega Wallace, PA-C   aluminum-magnesium hydroxide-simethicone 30 mL Oral Q4H PRN Omega Wallace, PA-C   benztropine 1 mg Intramuscular Q6H PRN Omega Wallace, PA-C   benztropine 1 mg Oral Q6H PRN Omega Wallace, PA-C   docusate sodium 100 mg Oral BID PRN Sonia Nicholson, PA-C   hydrOXYzine HCL 25 mg Oral Q6H PRN Omega Wallace, PA-C   hydrOXYzine HCL 50 mg Oral Q6H PRN Omega Wallace, PA-C   LORazepam 2 mg Intramuscular Q6H PRN Omega Wallace, PA-C   LORazepam 1 mg Oral Q6H PRN Omega Wallace, PA-C   OLANZapine 10 mg Oral HS David Cameron   OLANZapine 5 mg Oral Daily David Cameron   OLANZapine 10 mg Intramuscular Q8H PRN Yoana Duncan PA-C   OLANZapine 10 mg Oral Q8H PRN Yoana Duncan PA-C   risperiDONE 1 mg Oral Q3H PRN Yoana Duncan PA-C   traZODone 50 mg Oral HS PRN Yoana Duncan PA-C       Behavioral Health Medications: all current active meds have been reviewed  Vital signs in last 24 hours:       Laboratory results:    I have personally reviewed all pertinent laboratory/tests results    Labs in last 72 hours:   Recent Labs     10/28/19  1151 10/28/19  1511   WBC 6 42  --    RBC 3 74*  --    HGB 11 3*  --    HCT 34 8  --      --    RDW 13 6  --    NEUTROABS 4 78  --    SODIUM 126* 131*   K 3 4* 3 4*   CL 90* 98*   CO2 19* 21   BUN 3* 2*   CREATININE 0 81 0 81   GLUC 54* 222*   CALCIUM 8 9 7 8*   AST 32  --    ALT 25  --    ALKPHOS 47  --    TP 7 4  --    ALB 3 9  --    TBILI 0 80  --      Admission Labs:   Admission on 10/28/2019   Component Date Value    WBC 10/28/2019 6 42     RBC 10/28/2019 3 74*    Hemoglobin 10/28/2019 11 3*    Hematocrit 10/28/2019 34 8     MCV 10/28/2019 93     MCH 10/28/2019 30 2     MCHC 10/28/2019 32 5     RDW 10/28/2019 13 6     MPV 10/28/2019 9 9     Platelets 26/31/9798 300     nRBC 10/28/2019 0     Neutrophils Relative 10/28/2019 73     Immat GRANS % 10/28/2019 1     Lymphocytes Relative 10/28/2019 18     Monocytes Relative 10/28/2019 6     Eosinophils Relative 10/28/2019 1     Basophils Relative 10/28/2019 1     Neutrophils Absolute 10/28/2019 4 78     Immature Grans Absolute 10/28/2019 0 05     Lymphocytes Absolute 10/28/2019 1 16     Monocytes Absolute 10/28/2019 0 35     Eosinophils Absolute 10/28/2019 0 05     Basophils Absolute 10/28/2019 0 03     Sodium 10/28/2019 126*    Potassium 10/28/2019 3 4*    Chloride 10/28/2019 90*    CO2 10/28/2019 19*    ANION GAP 10/28/2019 17*    BUN 10/28/2019 3*    Creatinine 10/28/2019 0 81     Glucose 10/28/2019 54*    Calcium 10/28/2019 8 9     AST 10/28/2019 32     ALT 10/28/2019 25     Alkaline Phosphatase 10/28/2019 47     Total Protein 10/28/2019 7 4     Albumin 10/28/2019 3 9     Total Bilirubin 10/28/2019 0 80     eGFR 10/28/2019 86     Ethanol Lvl 10/28/2019 <3     Color, UA 10/28/2019 CLEAR     Clarity, UA 10/28/2019 CLEAR     Glucose, UA (Ref: Negati* 10/28/2019 NEG     Bilirubin, UA (Ref: Nega* 10/28/2019 NEG     Ketones, UA (Ref: Negati* 10/28/2019 80     Spec Grav, UA (Ref:1 003* 10/28/2019 1 005     Blood, UA (Ref: Negative) 10/28/2019 NEG     pH, UA (Ref: 4 5-8 0) 10/28/2019 6 0     Protein, UA (Ref: Negati* 10/28/2019 NEG     Urobilinogen, UA (Ref: 0* 10/28/2019 0 2      Leukocytes, UA (Ref: Ne* 10/28/2019 MOD     Nitrite, UA (Ref: Negati* 10/28/2019 NEG     Ventricular Rate 10/28/2019 63     Atrial Rate 10/28/2019 63     HI Interval 10/28/2019 168     QRSD Interval 10/28/2019 98     QT Interval 10/28/2019 402     QTC Interval 10/28/2019 411     P Axis 10/28/2019 62     QRS Axis 10/28/2019 51     T Wave Axis 10/28/2019 44     Amph/Meth UR 10/28/2019 Negative     Barbiturate Ur 10/28/2019 Negative     Benzodiazepine Urine 10/28/2019 Negative     Cocaine Urine 10/28/2019 Negative     Methadone Urine 10/28/2019 Negative     Opiate Urine 10/28/2019 Negative     PCP Ur 10/28/2019 Negative     THC Urine 10/28/2019 Negative     EXT PREG TEST UR (Ref: N* 10/28/2019 NEG     Control 10/28/2019 VALID     Sodium 10/28/2019 131*    Potassium 10/28/2019 3 4*    Chloride 10/28/2019 98*    CO2 10/28/2019 21     ANION GAP 10/28/2019 12     BUN 10/28/2019 2*    Creatinine 10/28/2019 0 81     Glucose 10/28/2019 222*    Calcium 10/28/2019 7 8*    eGFR 10/28/2019 86        Psychiatric Review of Systems:  Behavior over the last 24 hours:  Slow improvement  Sleep: normal  Appetite: poor  Medication side effects: No  ROS: no complaints    Mental Status Evaluation:  Appearance:  casually dressed   Behavior:  guarded   Speech:  loud   Mood:  angry   Affect: increased in range   Language naming objects   Thought Process:  circumstantial   Thought Content:  obsessions   Perceptual Disturbances: None   Risk Potential: Suicidal Ideations without plan, Homicidal Ideations none and Potential for Aggression No   Sensorium:  person and place   Cognition:  grossly intact   Consciousness:  awake    Attention: attention span appeared shorter than expected for age   Insight:  limited   Judgment: limited   Intellect fair   Gait/Station: normal gait/station   Motor Activity: no abnormal movements     Memory: Short and long term memory  fair     Progress Toward Goals: slow progress    Recommended Treatment:   Continue olanzapine 5 mg a m  And 10 mg at HS for psychosis and mood stabilization  Continue motivating patient for lab work and vitals sign monitoring  Continue with group therapy, milieu therapy and occupational therapy      Continue following current medications:   Current Facility-Administered Medications:  acetaminophen 325 mg Oral Q6H PRN Trey Lustholland, SHARON   acetaminophen 650 mg Oral Q4H PRN Trey Lusty, PA-MONTRELL   acetaminophen 975 mg Oral Q6H PRN Trey Westonstholland, PA-C   aluminum-magnesium hydroxide-simethicone 30 mL Oral Q4H PRN Trey Westonstholland, PA-C   benztropine 1 mg Intramuscular Q6H PRN Trey Westonstholland, PA-C   benztropine 1 mg Oral Q6H PRN Trey Barden, SHARON   docusate sodium 100 mg Oral BID PRN Sonia Nicholson PA-C   hydrOXYzine HCL 25 mg Oral Q6H PRN Trey Lusty, PA-C   hydrOXYzine HCL 50 mg Oral Q6H PRN Trey Westonstholland, PA-C   LORazepam 2 mg Intramuscular Q6H PRN Trey Westonstholland, PA-C   LORazepam 1 mg Oral Q6H PRN Trey Westonstholland, PA-C   OLANZapine 10 mg Oral HS [facility]   OLANZapine 5 mg Oral Daily [facility]   OLANZapine 10 mg Intramuscular Q8H PRN Trey Lusty, PA-C   OLANZapine 10 mg Oral Q8H PRN Trey Lusty, PA-C   risperiDONE 1 mg Oral Q3H PRN Trey Lustholland, PA-C   traZODone 50 mg Oral HS PRN Trey Braden PA-C       Risks, benefits and possible side effects of Medications:   Risks, benefits, and possible side effects of medications explained to patient and patient verbalizes understanding  Risks of medications in pregnancy explained if female patient  Patient verbalizes understanding and agrees to notify her doctor if she becomes pregnant  This note has been constructed using a voice recognition system  There may be translation, syntax,  or grammatical errors  If you have any questions, please contact the dictating provider

## 2019-10-31 NOTE — PROGRESS NOTES
Pt remains seclusive to room  Turned when this writer walked into the room and said hello however remains scant in conversation  Denies SI/HI, AVH  Spoke to pt regarding her poor appetite and importance in making sure she ate properly  Pt responded stating "I don't want to talk about that " Asked pt if she had any questions in which pt paused and then states "No, I don't want to talk now but thanks " pt however agreeable to seek staff if having any concerns  Compliant with medications  No irritability noted

## 2019-10-31 NOTE — PROGRESS NOTES
Gave update on pt to [address]  [facility] from [employer]  [employer] has [place]

## 2019-11-01 PROCEDURE — 99231 SBSQ HOSP IP/OBS SF/LOW 25: CPT | Performed by: PSYCHIATRY & NEUROLOGY

## 2019-11-01 RX ADMIN — OLANZAPINE 10 MG: 10 TABLET, ORALLY DISINTEGRATING ORAL at 22:04

## 2019-11-01 RX ADMIN — OLANZAPINE 5 MG: 5 TABLET, ORALLY DISINTEGRATING ORAL at 08:39

## 2019-11-01 NOTE — PROGRESS NOTES
Pt less willing to talk as day progresses  Says "I don't want to talk about it " Pt appears to be RIS while sitting in her bed before being aware that writer had entered the room  Sx stopped during brief interaction  Did say that she drank her Ensure for lunch

## 2019-11-01 NOTE — PROGRESS NOTES
Progress Note - Behavioral Health   Adolph Arceo 51 y o  female MRN: [id number]  Unit/Bed#: [facility] 254-02 Encounter: [id number]    Assessment/Plan   Principal Problem:    Schizophrenia ([address] )  Active Problems:    Eating disorder    Subjective:  Patient is compliant with medication but remained defiant with getting lab work and vitals on the unit  Patient will not verbalize recent for refusing lab work despite educational attempts on needing lab work to follow-up on her electrolytes  Patient continues to verbalize having poor appetite but reports drinking Ensure  Patient is currently denying any physical symptoms of hyponatremia or changes in other electrolytes  Patient continues to deny endorsing or having any psychiatric diagnosis and remained focused on going to shelter  Limit setting was done during entire evaluation and educated that she will be discharge back to [address] [place] [address] once stable  Patient terminated the interview early and refused to sign the treatment plan during treatment team meeting      Current Medications:    Current Facility-Administered Medications:  acetaminophen 325 mg Oral Q6H PRN Yariel Ny, PA-C   acetaminophen 650 mg Oral Q4H PRN [place] Ny, PA-C   acetaminophen 975 mg Oral Q6H PRN [place] Ny, PA-C   aluminum-magnesium hydroxide-simethicone 30 mL Oral Q4H PRN [place] Ny, PA-C   benztropine 1 mg Intramuscular Q6H PRN [place] Ny, PA-C   benztropine 1 mg Oral Q6H PRN [place] Ny, PA-C   docusate sodium 100 mg Oral BID PRN Sonia Nicholson, PA-C   hydrOXYzine HCL 25 mg Oral Q6H PRN [place] Ny, PA-C   hydrOXYzine HCL 50 mg Oral Q6H PRN Yariel Ny, PA-C   LORazepam 2 mg Intramuscular Q6H PRN Yariel Ny, PA-C   LORazepam 1 mg Oral Q6H PRN Yariel Ny, PA-C   OLANZapine 10 mg Oral HS David Cameron   OLANZapine 5 mg Oral Daily David Cameron   OLANZapine 10 mg Intramuscular Q8H PRN Yariel Ny, PA-C   OLANZapine 10 mg Oral Q8H PRN Deneen Eye, PALIN   risperiDONE 1 mg Oral Q3H PRN Deneen Eye, PALIN   traZODone 50 mg Oral HS PRN Deneen Eye, SHARON       Behavioral Health Medications: all current active meds have been reviewed  Vital signs in last 24 hours:       Laboratory results:    I have personally reviewed all pertinent laboratory/tests results  Labs in last 72 hours: No results for input(s): WBC, RBC, HGB, HCT, PLT, RDW, NEUTROABS, SODIUM, K, CL, CO2, BUN, CREATININE, GLUCOSE, GLUC, GLUF, CALCIUM, AST, ALT, ALKPHOS, TP, ALB, TBILI, CHOLESTEROL, HDL, TRIG, LDLCALC, VALPROICTOT, CARBAMAZEPIN, LITHIUM, AMMONIA, XCR6DULOXFLM, FREET4, T3FREE, PREGTESTUR, PREGSERUM, HCG, HCGQUANT, RPR in the last 72 hours      Invalid input(s):  RBC  Admission Labs:   Admission on 10/28/2019   Component Date Value    WBC 10/28/2019 6 42     RBC 10/28/2019 3 74*    Hemoglobin 10/28/2019 11 3*    Hematocrit 10/28/2019 34 8     MCV 10/28/2019 93     MCH 10/28/2019 30 2     MCHC 10/28/2019 32 5     RDW 10/28/2019 13 6     MPV 10/28/2019 9 9     Platelets 73/99/5450 300     nRBC 10/28/2019 0     Neutrophils Relative 10/28/2019 73     Immat GRANS % 10/28/2019 1     Lymphocytes Relative 10/28/2019 18     Monocytes Relative 10/28/2019 6     Eosinophils Relative 10/28/2019 1     Basophils Relative 10/28/2019 1     Neutrophils Absolute 10/28/2019 4 78     Immature Grans Absolute 10/28/2019 0 05     Lymphocytes Absolute 10/28/2019 1 16     Monocytes Absolute 10/28/2019 0 35     Eosinophils Absolute 10/28/2019 0 05     Basophils Absolute 10/28/2019 0 03     Sodium 10/28/2019 126*    Potassium 10/28/2019 3 4*    Chloride 10/28/2019 90*    CO2 10/28/2019 19*    ANION GAP 10/28/2019 17*    BUN 10/28/2019 3*    Creatinine 10/28/2019 0 81     Glucose 10/28/2019 54*    Calcium 10/28/2019 8 9     AST 10/28/2019 32     ALT 10/28/2019 25     Alkaline Phosphatase 10/28/2019 47     Total Protein 10/28/2019 7 4  Albumin 10/28/2019 3 9     Total Bilirubin 10/28/2019 0 80     eGFR 10/28/2019 86     Ethanol Lvl 10/28/2019 <3     Color, UA 10/28/2019 CLEAR     Clarity, UA 10/28/2019 CLEAR     Glucose, UA (Ref: Negati* 10/28/2019 NEG     Bilirubin, UA (Ref: Nega* 10/28/2019 NEG     Ketones, UA (Ref: Negati* 10/28/2019 80     Spec Grav, UA (Ref:1 003* 10/28/2019 1 005     Blood, UA (Ref: Negative) 10/28/2019 NEG     pH, UA (Ref: 4 5-8 0) 10/28/2019 6 0     Protein, UA (Ref: Negati* 10/28/2019 NEG     Urobilinogen, UA (Ref: 0* 10/28/2019 0 2      Leukocytes, UA (Ref: Ne* 10/28/2019 MOD     Nitrite, UA (Ref: Negati* 10/28/2019 NEG     Ventricular Rate 10/28/2019 63     Atrial Rate 10/28/2019 63     TN Interval 10/28/2019 168     QRSD Interval 10/28/2019 98     QT Interval 10/28/2019 402     QTC Interval 10/28/2019 411     P Axis 10/28/2019 62     QRS Axis 10/28/2019 51     T Wave Axis 10/28/2019 44     Amph/Meth UR 10/28/2019 Negative     Barbiturate Ur 10/28/2019 Negative     Benzodiazepine Urine 10/28/2019 Negative     Cocaine Urine 10/28/2019 Negative     Methadone Urine 10/28/2019 Negative     Opiate Urine 10/28/2019 Negative     PCP Ur 10/28/2019 Negative     THC Urine 10/28/2019 Negative     EXT PREG TEST UR (Ref: N* 10/28/2019 NEG     Control 10/28/2019 VALID     Sodium 10/28/2019 131*    Potassium 10/28/2019 3 4*    Chloride 10/28/2019 98*    CO2 10/28/2019 21     ANION GAP 10/28/2019 12     BUN 10/28/2019 2*    Creatinine 10/28/2019 0 81     Glucose 10/28/2019 222*    Calcium 10/28/2019 7 8*    eGFR 10/28/2019 86        Psychiatric Review of Systems:  Behavior over the last 24 hours:  unchanged  Sleep: normal  Appetite: poor (only drinking ensure)  Medication side effects: No  ROS: poor appetite    Mental Status Evaluation:  Appearance:  casually dressed   Behavior:  guarded   Speech:  loud   Mood:  angry   Affect:  increased in range   Language rapid   Thought Process: disorganized   Thought Content:  obsessions   Perceptual Disturbances: Appears preoccupied but denies endorsing psychotic symptoms  Risk Potential: Suicidal Ideations without plan, Homicidal Ideations none and Potential for Aggression No   Sensorium:  person, place and time/date   Cognition:  grossly intact   Consciousness:  awake    Attention: attention span appeared shorter than expected for age   Insight:  limited   Judgment: limited   Intellect fair   Gait/Station: normal gait/station   Motor Activity: no abnormal movements     Memory: Short and long term memory  fair     Progress Toward Goals: slow progress    Recommended Treatment:   Continue olanzapine 5 mg a m  and 10 mg at HS for mood stabilization  Continue motivating patient for lab work and eating 3 meals a day  Patient is not receptive to switching Zyprexa to medication with long injecting formulation available  Continue with group therapy, milieu therapy and occupational therapy      Continue following current medications:   Current Facility-Administered Medications:  acetaminophen 325 mg Oral Q6H PRN [place] Smart, PA-C   acetaminophen 650 mg Oral Q4H PRN Tito Smart, PA-C   acetaminophen 975 mg Oral Q6H PRN [place] Smart, PA-C   aluminum-magnesium hydroxide-simethicone 30 mL Oral Q4H PRN [place] Smart, PA-C   benztropine 1 mg Intramuscular Q6H PRN Tito Smart, PA-C   benztropine 1 mg Oral Q6H PRN Tito Smart, PA-C   docusate sodium 100 mg Oral BID PRN Sonia Nicholson, PA-C   hydrOXYzine HCL 25 mg Oral Q6H PRN [place] Smart, PA-C   hydrOXYzine HCL 50 mg Oral Q6H PRN Tito Smart, PA-C   LORazepam 2 mg Intramuscular Q6H PRN Tito Smart, PA-C   LORazepam 1 mg Oral Q6H PRN [place] Smart, PA-C   OLANZapine 10 mg Oral HS David Cameron   OLANZapine 5 mg Oral Daily David Cameron   OLANZapine 10 mg Intramuscular Q8H PRN [place] Smart, PA-C   OLANZapine 10 mg Oral Q8H PRN [place] Smart, PA-C risperiDONE 1 mg Oral Q3H PRN Neil Jean Baptiste PA-C   traZODone 50 mg Oral HS PRN Neil Jean Baptiste PA-C       Risks, benefits and possible side effects of Medications:   Risks, benefits, and possible side effects of medications explained to patient and patient verbalizes understanding  Risks of medications in pregnancy explained if female patient  Patient verbalizes understanding and agrees to notify her doctor if she becomes pregnant  This note has been constructed using a voice recognition system  There may be translation, syntax,  or grammatical errors  If you have any questions, please contact the dictating provider

## 2019-11-01 NOTE — PROGRESS NOTES
Pt depressed, irritable edge  Scant conversation  Isolative to room  Does not attend groups or interact socially with peers  Denies AVH, but is observed RIS or performing some type of ritual (hand gestures/motions, head movements and rolling of the eyes)  Continues to refuse meals, Ensure is only source of nutrition  Pt is compliant with medications

## 2019-11-01 NOTE — PLAN OF CARE
Informal supports identifies  Pt can return to [facility]  Pt has supportive environment  Outpatient appointments are yet to be set up due to lack of dc date  Pt is taking medications and is progressing

## 2019-11-01 NOTE — PROGRESS NOTES
11/01/19 0736   Team Meeting   Meeting Type Daily Rounds   Team Members Present   Team Members Present Physician;Nurse;;Occupational Therapist   Physician Team Member Dr Bahman Camargo NO   Nursing Team Member [facility] Management Team Member Martha   OT Team Member Darwin   Patient/Family Present   Patient Present No   Patient's Family Present No     Denies SI  Still refusing vital signs  Pt has been up early this morning  Pt is not agreeable for any other medication changes  Pt will not agree for long acting injectable  No dc date at this time

## 2019-11-01 NOTE — PROGRESS NOTES
11/01/19 0816   Team Meeting   Meeting Type Tx Team Meeting   Team Members Present   Team Members Present Physician;Nurse;   Physician Team Member Dr Marivel Chopra Team Member NAHID [facility] Management Team Member Mark   Patient/Family Present   Patient Present Yes   Patient's Family Present No     TX team discussed diagnosis of Schizophrenia  TX discussed planning for pt to return to [facility] with therapy and medication management through [employer]  Pt has a Guardian who is the decision maker for pt  Guardian is mother who reports that she would like pt to return to [facility] and not a shelter  Pt did not want to discuss any strengths she feels that she has  Pt was a little combatative during the meeting

## 2019-11-01 NOTE — PROGRESS NOTES
Pt is seclusive to room  Slight irritable edge initially but did respond to questions  Says she drank her Ensure, declined crackers or Ev Zhou  Said they made her constipated  Informed pt we have fruit if she would like that as a snack  Pt says she will wait to see what's on her lunch tray

## 2019-11-02 PROCEDURE — 99231 SBSQ HOSP IP/OBS SF/LOW 25: CPT | Performed by: STUDENT IN AN ORGANIZED HEALTH CARE EDUCATION/TRAINING PROGRAM

## 2019-11-02 RX ADMIN — OLANZAPINE 10 MG: 10 TABLET, ORALLY DISINTEGRATING ORAL at 22:04

## 2019-11-02 RX ADMIN — OLANZAPINE 5 MG: 5 TABLET, ORALLY DISINTEGRATING ORAL at 09:22

## 2019-11-02 NOTE — PROGRESS NOTES
Progress Note - Behavioral Health   Jerald Talavera 51 y o  female MRN: [id number]  Unit/Bed#: Dennis Fabry 133-38 Encounter: [id number]    Subjective:    Per nursing, patient refused labwork this morning, remains calm, scant with staff, compliant with meds, responding to internal stimuli  Per patient, patient reports that she has trouble eating, has no drive to eat, reports that she has felt that way for the 2 weeks leading up to admission  She reports that she has been drinking the Ensure supplements  She denies any passive or active SI  Denies any auditory or visual hallucinations  She reports staff is treating her okay  She reports tolerating Zyprexa well without reported side effects except for a little daytime drowsiness  Behavior over the last 24 hours:  improved  Medication side effects: No  ROS: sedation    Objective:    Mental Status Evaluation:  Appearance:  Sitting in bed in dark, withdrawn, cooperative with interview but sparse in speech   Behavior:  No tics, tremors, or behaviors observed   Speech:  Soft volume, normal rate and rhythm, sparse   Mood: ""Fine""   Affect:  Appears blunted   Thought Process:  Linear and goal directed   Associations intact associations   Thought Content:  No passive or active suicidal or homicidal ideation, intent, or plan  Perceptual Disturbances: Denies any auditory or visual hallucinations and Appears internally preoccupied   Sensorium:  Oriented to person, place, time, and situation   Memory:  recent and remote memory grossly intact   Consciousness:  alert and awake   Attention: attention span and concentration were age appropriate   Insight:  poor   Judgment: impaired   Gait/Station: Did not assess   Motor Activity: no abnormal movements     Progress Toward Goals: Progressing    Recommended Treatment: Continue with group therapy, milieu therapy and occupational therapy        Risks, benefits and possible side effects of Medications:   Risks, benefits, and possible side effects of medications explained to patient and patient verbalizes understanding  Medications: all current active meds have been reviewed  Current Facility-Administered Medications:  acetaminophen 325 mg Oral Q6H PRN Elizabeth Montreat, PA-C   acetaminophen 650 mg Oral Q4H PRN Elizabeth Montreat, PA-C   acetaminophen 975 mg Oral Q6H PRN Elizabeth Montreat, PA-C   aluminum-magnesium hydroxide-simethicone 30 mL Oral Q4H PRN Elizabeth Montreat, PA-C   benztropine 1 mg Intramuscular Q6H PRN Elizabeth Montreat, PA-C   benztropine 1 mg Oral Q6H PRN Elizabeth Montreat, PA-C   docusate sodium 100 mg Oral BID PRN Sonia Nicholson, PA-C   hydrOXYzine HCL 25 mg Oral Q6H PRN Elizabeth Montreat, PA-C   hydrOXYzine HCL 50 mg Oral Q6H PRN Brewster Montreat, PA-C   LORazepam 2 mg Intramuscular Q6H PRN Elizabeth Montreat, PA-C   LORazepam 1 mg Oral Q6H PRN Elizabeth Montreat, PA-C   OLANZapine 10 mg Oral HS [facility]   OLANZapine 5 mg Oral Daily [facility]   OLANZapine 10 mg Intramuscular Q8H PRN Elizabeth Montreat, PA-C   OLANZapine 10 mg Oral Q8H PRN Elizabeth Montreat, PA-C   risperiDONE 1 mg Oral Q3H PRN Elizabeth Montreat, PA-C   traZODone 50 mg Oral HS PRN Brewster Montreat, PA-C       Assessment/Plan   Principal Problem:    Schizophrenia [facility])  Active Problems:    Eating disorder    50 y/o Female with schizophrenia, eating disorder- drinking nutritional supplement but not eating solid food, appears internally preoccupied at times, compliant with medication but refusing labs and vitals  Plan:  -Continue current med regimen

## 2019-11-02 NOTE — PROGRESS NOTES
Pt compliant with meds  Seclusive to room  Scant in conversation  Consumed Ensure for breakfast and lunch  Says she is trying to eat but isn't able  Observed RIS while sitting in bed

## 2019-11-02 NOTE — PROGRESS NOTES
Pt denies all sx  Seclusive to room and self  Observed patient filling up water cup from her bathroom sink

## 2019-11-02 NOTE — PROGRESS NOTES
Pt appeared to have slept first half of the night without difficulty  Did wake around 65  Remains calm however scant with staff  Attending to ADL's  Refused lab work this morning, would not give reason other than she wanted to sleep some more

## 2019-11-03 PROCEDURE — 99231 SBSQ HOSP IP/OBS SF/LOW 25: CPT | Performed by: STUDENT IN AN ORGANIZED HEALTH CARE EDUCATION/TRAINING PROGRAM

## 2019-11-03 RX ORDER — OLANZAPINE 10 MG/1
10 TABLET, ORALLY DISINTEGRATING ORAL EVERY EVENING
Status: DISCONTINUED | OUTPATIENT
Start: 2019-11-03 | End: 2019-11-06 | Stop reason: HOSPADM

## 2019-11-03 RX ADMIN — OLANZAPINE 10 MG: 10 TABLET, ORALLY DISINTEGRATING ORAL at 17:30

## 2019-11-03 RX ADMIN — OLANZAPINE 5 MG: 5 TABLET, ORALLY DISINTEGRATING ORAL at 08:56

## 2019-11-03 NOTE — PROGRESS NOTES
Pt continues to be seclusive to room  Did not accept extra Ensure this evening  States she will drink one Ensure and has extra in room if needed  Pt appreciated Zyprexa being changed to dinner time as she prefers to go to sleep early  Remain scant in conversation and did not wish to talk further

## 2019-11-03 NOTE — PROGRESS NOTES
Daily Rounds:    Refused labs and vitals  Compliant with meds  Will change HS Zyprexa to after dinner per pt request   Pt is seclusive/scant  Drinking Ensure

## 2019-11-03 NOTE — NUTRITION
11/03/19 1008   Assessment   Timepoint Reassess   Labs   List Completed Labs   (11/3/19 Glucose 222 NA+ 131K+ 3 4 meds: reviewed)   Feeding Route   PO Independent   Adequacy of Intake   Nutrition Modality PO   Intake Meals 0-25%   Intake Supplements %   Estimated Calorie Intake 50-75%  (46-69% )   Estimated Protein Intake  50-75%  (54-81%)   Estimated Fluid Intake %   Estimated calorie intake compared to estimated need pt continues to refuse meals, but will drink Ensure per staff  appears to drink 2-3 Ensure Enlive per day  >? also drinking water  po intake suboptimal to meet estimated needs  Nutrition Prognosis   Nutrition Concerns   (per chart review: schizophrenia, eating disorder h/o anorexia  skin: no pressure ulcers noted per skin care plan  )   PES Statement   Problem Continue previous diagnosis   Patient Nutrition Goals   Goal avoid weight loss;meet PO needs   Goal Status revised;not met;extended;met   Timeframe to complete goal by next f/u   Recommendations/Interventions   Summary 5 Ensure Enlive if consumed, would mazimize pt's nutrition (1750 kcal, 100 gm protei)  4 Ensure Enlive alone would be adequate to meet low end of pt's estimated needs  (1400 kcal, 80 gm protein)  Pt only likes Vanilla flavor  Will adjust order  Interventions Diet: continued as ordered   Nutrition Recommendations Continue diet as ordered; Other (specify)  (Per RD protocol will increase Ensure Enlive to 5 x daily   Monitor water intake and I/O's  )   Nutrition Complexity Risk   Nutrition complexity level High risk   Follow up date 11/07/19  (po intake supplement intake  )

## 2019-11-03 NOTE — PROGRESS NOTES
Pt had difficulty falling asleep d/t staff presence in room for other issues  Once asleep pt did sleep until 0200, waking again an hour later at 0200(return to Standard Time)  Pt confused by this but did laugh when staff explained  Pt currently lying in bed, appears to be sleeping

## 2019-11-03 NOTE — PLAN OF CARE
Problem: PSYCHOSIS  Goal: Will report no hallucinations or delusions  Description  Interventions:  - Administer medication as  ordered  - Every waking shifts and PRN assess for the presence of hallucinations and or delusions  - Assist with reality testing to support increasing orientation  - Assess if patient's hallucinations or delusions are encouraging self-harm or harm to others and intervene as appropriate  Outcome: Progressing     Problem: SELF CARE DEFICIT  Goal: Return ADL status to a safe level of function  Description  INTERVENTIONS:  - Administer medication as ordered  - Assess ADL deficits and provide assistive devices as needed  - Obtain PT/OT consults as needed  - Assist and instruct patient to increase activity and self care as tolerated  Outcome: Progressing     Problem: SELF HARM/SUICIDALITY  Goal: Will have no self-injury during hospital stay  Description  INTERVENTIONS:  - Q 15 MINUTES: Routine safety checks  - Q WAKING SHIFT & PRN: Assess risk to determine if routine checks are adequate to maintain patient safety  - Encourage patient to participate actively in care by formulating a plan to combat response to suicidal ideation, identify supports and resources  Outcome: Progressing     Problem: Nutrition/Hydration-ADULT  Goal: Nutrient/Hydration intake appropriate for improving, restoring or maintaining nutritional needs  Description  Monitor and assess patient's nutrition/hydration status for malnutrition  Collaborate with interdisciplinary team and initiate plan and interventions as ordered  Monitor patient's weight and dietary intake as ordered or per policy  Utilize nutrition screening tool and intervene as necessary  Determine patient's food preferences and provide high-protein, high-caloric foods as appropriate       INTERVENTIONS:  - Monitor oral intake, urinary output, labs, and treatment plans  - Assess nutrition and hydration status and recommend course of action  - Evaluate amount of meals eaten  - Assist patient with eating if necessary   - Allow adequate time for meals  - Recommend/ encourage appropriate diets, oral nutritional supplements, and vitamin/mineral supplements  - Order, calculate, and assess calorie counts as needed  - Recommend, monitor, and adjust tube feedings and TPN/PPN based on assessed needs  - Assess need for intravenous fluids  - Provide specific nutrition/hydration education as appropriate  - Include patient/family/caregiver in decisions related to nutrition  Outcome: Progressing     Problem: Ineffective Coping  Goal: Participates in unit activities  Description  Interventions:  - Provide therapeutic environment   - Provide required programming   - Redirect inappropriate behaviors   Outcome: Not Progressing

## 2019-11-03 NOTE — PROGRESS NOTES
Progress Note - Behavioral Health   Roxi Morgan 51 y o  female MRN: [id number]  Unit/Bed#: [facility] [place] 660-59 Encounter: [id number]    Subjective:    Per nursing, patient had difficulty falling asleep, refusing labs and vitals this morning, drinking Ensure    Per patient, patient reports that things are going okay  She wants to take Zyprexa earlier in the evening so she isn't woken up to take it after she falls asleep  She reports her appetite continues to be low, not feeling that she can eat any solid food  She reports drinking the Ensure okay  She reports sleeping fine  Denies any physical symptoms  She reports feeling frustrated at times, wanting to go live at a shelter  She denies any SI/HI, denies any AH/VH  Behavior over the last 24 hours:  unchanged  Medication side effects: No  ROS: no complaints    Objective:    Mental Status Evaluation:  Appearance:  Sitting on bed, dressed in hospital gown, cooperative with interview   Behavior:  No tics, tremors, or behaviors observed   Speech:  Normal rate, rhythm, and volume   Mood:  "frustrated"   Affect:  Appears constricted in depressed range, stable, mood-congruent   Thought Process:  Linear and goal directed   Associations intact associations   Thought Content:  No passive or active suicidal or homicidal ideation, intent, or plan  Perceptual Disturbances: Denies any auditory or visual hallucinations   Sensorium:  Oriented to person, place, time, and situation   Memory:  recent and remote memory grossly intact   Consciousness:  alert and awake   Attention: attention span and concentration were age appropriate   Insight:  poor   Judgment: impaired   Gait/Station: normal gait/station   Motor Activity: no abnormal movements       Labs: Refusing labwork    Progress Toward Goals: Minimal progress    Recommended Treatment: Continue with group therapy, milieu therapy and occupational therapy        Risks, benefits and possible side effects of Medications:   Risks, benefits, and possible side effects of medications explained to patient and patient verbalizes understanding  Medications: all current active meds have been reviewed  Current Facility-Administered Medications:  acetaminophen 325 mg Oral Q6H PRN Neil Jean Baptiste PA-C   acetaminophen 650 mg Oral Q4H PRN Neil Jean Baptiste, SHARON   acetaminophen 975 mg Oral Q6H PRN Neil Jean Baptiste, SHARON   aluminum-magnesium hydroxide-simethicone 30 mL Oral Q4H PRN Neil Jean Baptiste, SHARON   benztropine 1 mg Intramuscular Q6H PRN Neil Jean Baptiste, SHARON   benztropine 1 mg Oral Q6H PRN Neil Jean Baptiste, SHARON   docusate sodium 100 mg Oral BID PRN Sonia Nicholson, SHARON   hydrOXYzine HCL 25 mg Oral Q6H PRN Neil Jean Baptiste, SHARON   hydrOXYzine HCL 50 mg Oral Q6H PRN Neil Jean Baptiste, SHARON   LORazepam 2 mg Intramuscular Q6H PRN Neil Jean Baptiste, SHARON   LORazepam 1 mg Oral Q6H PRN Neil Jean Baptiste PA-C   OLANZapine 10 mg Oral HS [facility]   OLANZapine 5 mg Oral Daily [facility]   OLANZapine 10 mg Intramuscular Q8H PRN Neil Jean Baptiste, SHARON   OLANZapine 10 mg Oral Q8H PRN Neil Jean Baptiste, SHARON   risperiDONE 1 mg Oral Q3H PRN Neil Jean Baptiste, PA-C   traZODone 50 mg Oral HS PRN Neil Jean Baptiste, SHARON       Assessment/Plan   Principal Problem:    Schizophrenia [facility])  Active Problems:    Eating disorder    52 y/o Female with schizophrenia and eating disorder- continues to drink Ensure supplements only, no solid food intake, able to express self a bit better, reports low appetite  Plan:  -Continue current med regimen   -Continue to encourage to get labwork and vitals

## 2019-11-03 NOTE — PROGRESS NOTES
Pt seclusive to room, scant in conversation  Pt states "I don't want to talk about it"  Pt consenting for safety  Denies SI/HI/AVH  Would appreciate Zyprexa dose given after dinner

## 2019-11-04 PROCEDURE — 99231 SBSQ HOSP IP/OBS SF/LOW 25: CPT | Performed by: PHYSICIAN ASSISTANT

## 2019-11-04 RX ADMIN — OLANZAPINE 5 MG: 5 TABLET, ORALLY DISINTEGRATING ORAL at 09:46

## 2019-11-04 RX ADMIN — OLANZAPINE 10 MG: 10 TABLET, ORALLY DISINTEGRATING ORAL at 17:10

## 2019-11-04 NOTE — PROGRESS NOTES
Progress Note - Behavioral Health   Mert Burnham 51 y o  female MRN: [id number]  Unit/Bed#: [facility] 254-02 Encounter: [id number]    Assessment/Plan   Principal Problem:    Schizophrenia [facility])  Active Problems:    Eating disorder      Subjective:  Patient is seclusive to her room and irritable  Patient's mother is her legal guardian and makes her decisions  Is in group home setting right now and does not want to return to group home  Patient states she wants to live outside in the tent and go to [employer] for her food  States she likes to wander and plans to do so after discharge  Patient was given the option of only returning to her group home, which became more irritable  Patient has limited insight and judgment over this matter  At this time patient refused to eat food and only is drinking protein shakes  Also is refusing lab work and vital signs  Patient appears to struggle with control and is using this as means of control  Overall defiant and child-like  During interview patient denied active hallucinations, suicidal thoughts, homicidal thoughts, and is not manic  Possible underlying chronic paranoia but denied this when asked  Denied most mood related symptoms and states she is just frustrated and irritable with everything  Does takes Zyprexa as prescribed  Appears to be tolerating Zyprexa well without serious side effects  Collaborate with group home with tentative upcoming discharge      Current Medications:  Current Facility-Administered Medications   Medication Dose Route Frequency    acetaminophen (TYLENOL) tablet 325 mg  325 mg Oral Q6H PRN    acetaminophen (TYLENOL) tablet 650 mg  650 mg Oral Q4H PRN    acetaminophen (TYLENOL) tablet 975 mg  975 mg Oral Q6H PRN    aluminum-magnesium hydroxide-simethicone (MYLANTA) 200-200-20 mg/5 mL oral suspension 30 mL  30 mL Oral Q4H PRN    benztropine (COGENTIN) injection 1 mg  1 mg Intramuscular Q6H PRN    benztropine (COGENTIN) tablet 1 mg  1 mg Oral Q6H PRN    docusate sodium (COLACE) capsule 100 mg  100 mg Oral BID PRN    hydrOXYzine HCL (ATARAX) tablet 25 mg  25 mg Oral Q6H PRN    hydrOXYzine HCL (ATARAX) tablet 50 mg  50 mg Oral Q6H PRN    LORazepam (ATIVAN) 2 mg/mL injection 2 mg  2 mg Intramuscular Q6H PRN    LORazepam (ATIVAN) tablet 1 mg  1 mg Oral Q6H PRN    OLANZapine (ZyPREXA ZYDIS) dispersible tablet 10 mg  10 mg Oral QPM    OLANZapine (ZyPREXA ZYDIS) dispersible tablet 5 mg  5 mg Oral Daily    OLANZapine (ZyPREXA) IM injection 10 mg  10 mg Intramuscular Q8H PRN    OLANZapine (ZyPREXA) tablet 10 mg  10 mg Oral Q8H PRN    risperiDONE (RisperDAL M-TABS) dispersible tablet 1 mg  1 mg Oral Q3H PRN    traZODone (DESYREL) tablet 50 mg  50 mg Oral HS PRN       Behavioral Health Medications: all current active meds have been reviewed and continue current psychiatric medications  Vitals:  Vitals:    10/29/19 0612   SpO2: 97%       Laboratory results:    I have personally reviewed all pertinent laboratory/tests results    Most Recent Labs:   Lab Results   Component Value Date    WBC 6 42 10/28/2019    RBC 3 74 (L) 10/28/2019    HGB 11 3 (L) 10/28/2019    HCT 34 8 10/28/2019     10/28/2019    RDW 13 6 10/28/2019    NEUTROABS 4 78 10/28/2019    SODIUM 131 (L) 10/28/2019    K 3 4 (L) 10/28/2019    CL 98 (L) 10/28/2019    CO2 21 10/28/2019    BUN 2 (L) 10/28/2019    CREATININE 0 81 10/28/2019    GLUC 222 (H) 10/28/2019    CALCIUM 7 8 (L) 10/28/2019    AST 32 10/28/2019    ALT 25 10/28/2019    ALKPHOS 47 10/28/2019    TP 7 4 10/28/2019    ALB 3 9 10/28/2019    TBILI 0 80 10/28/2019    CHOLESTEROL 192 10/05/2019    HDL 72 (H) 10/05/2019    TRIG 98 10/05/2019    LDLCALC 100 10/05/2019    NONHDLC 120 10/05/2019    [id number] 3 291 10/05/2019    PREGSERUM Negative 10/05/2019       Psychiatric Review of Systems:  Behavior over the last 24 hours:  unchanged  Sleep: normal  Appetite: poor  Medication side effects: No  ROS: no complaints    Mental Status Evaluation:  Appearance:  child-like   Behavior:  Guarded and defiant   Speech:  normal pitch and normal volume   Mood:  irritable   Affect:  constricted   Language repeating phrases   Thought Process:  goal directed and illogical   Thought Content:  obsessions   Perceptual Disturbances: None   Risk Potential: Denied SI/HI  Potential for aggression: No   Sensorium:  person, place and time/date   Cognition:  grossly intact   Consciousness:  alert and awake    Recent and Remote Memory fair   Attention: attention span appeared shorter than expected for age   Insight:  limited   Judgment: limited   Gait/Station: normal gait/station and normal balance   Motor Activity: no abnormal movements     Progress Toward Goals: unchanged    Recommended Treatment: Continue with group therapy, milieu therapy and occupational therapy  1   Continue current medications  2  Encourage vital signs and labwork (BMP only ordered at this time)  3  Disposition planning with collaboration of [facility]    Risks, benefits and possible side effects of Medications:   Risks, benefits, and possible side effects of medications explained to patient and patient verbalizes understanding        Artie Bates PA-C

## 2019-11-04 NOTE — PROGRESS NOTES
Pt remains seclusive to room  Did not attend groups throughout day  Reports feeling frustrated  Pt said she was told she doesn't have any options regarding discharge  Continues to talk about wanting to go to a shelter rather than her group home  Pt said she would like to live more freely and follow the [address]  Pt said she will look at her dinner tray but does not feel ready to eat solid foods  Pt said she is still drinking her ensure drinks and water  Pt denies SI/HI  Pt can be irritable at times

## 2019-11-04 NOTE — PROGRESS NOTES
11/04/19 0754   Team Meeting   Meeting Type Daily Rounds   Team Members Present   Team Members Present Physician;Nurse;;Occupational Therapist   Physician Team Member Sima Márquez   Nursing Team Member NAHID [facility] Management Team Member Mark/ [address]   OT Team Member Darwin   Patient/Family Present   Patient Present No   Patient's Family Present No     Saw dietician yesterday  Pt is still refusing labs  Pt wants Zyprexa changes from H/S to morning  Pt is still wanting to be discharged to a shelter

## 2019-11-04 NOTE — PROGRESS NOTES
Pt reports feeling "frustrated " Pt said she appreciates the doctor's concern but would prefer to discharge to a shelter  Discussed that her current living situation provides a safer option and pt said she has gone to shelters in the past  Pt laughed and said "it's like an adventure " Pt refused AM labs, PA made aware  Pt said she has been compliant with medicine and appreciates the way the doctor scheduled it  No SI/HI  Pt continues to say she has been unable to get herself to eat solid foods but has been drinking her ensure  No questions/concerns at this time  Pt very irritable after initial conversation with writer  Pt approached RN station demanding that writer bring her ensure to the dining room for her and fill out her menu for her  Pt stated that she did not want to do it  Pt was encouraged to complete these tasks on her own  Pt stated she would not and stormed back to her bedroom

## 2019-11-05 PROBLEM — E43 SEVERE PROTEIN-CALORIE MALNUTRITION (HCC): Status: ACTIVE | Noted: 2019-11-05

## 2019-11-05 PROCEDURE — 99231 SBSQ HOSP IP/OBS SF/LOW 25: CPT | Performed by: PHYSICIAN ASSISTANT

## 2019-11-05 RX ORDER — OLANZAPINE 10 MG/1
10 TABLET ORAL
Qty: 14 TABLET | Refills: 0 | Status: SHIPPED | OUTPATIENT
Start: 2019-11-05

## 2019-11-05 RX ORDER — OLANZAPINE 5 MG/1
5 TABLET ORAL EVERY MORNING
Qty: 14 TABLET | Refills: 0 | Status: SHIPPED | OUTPATIENT
Start: 2019-11-05

## 2019-11-05 RX ADMIN — OLANZAPINE 10 MG: 10 TABLET, ORALLY DISINTEGRATING ORAL at 17:03

## 2019-11-05 RX ADMIN — OLANZAPINE 5 MG: 5 TABLET, ORALLY DISINTEGRATING ORAL at 09:03

## 2019-11-05 NOTE — PROGRESS NOTES
Progress Note - Behavioral Health   Ayanna Peck 51 y o  female MRN: [id number]  Unit/Bed#: [facility] 254-02 Encounter: [id number]    Assessment/Plan   Principal Problem:    Schizophrenia ([facility] [address] )  Active Problems:    Eating disorder    Severe protein-calorie malnutrition ([facility] [address] )      Subjective:  Patient child-like and defiant  Eventually patient gave up and reports she will return to her previous group home  Focused on living on her own in the community inside of a tent  Was explained to patient this is not feasible nor logical   Her mother is her POA  Has intense dislike of her mother which may be chronic underlying paranoia  Appears to have poor insight and judgment  At this time denies most psychiatric symptoms  Reports of possible internal preoccupation at times  Does deny hallucinations when asked  Would not go into mood related symptoms today but denied suicidal and homicidal ideation  Does not appear to show manic symptoms  Did appear somewhat anxious over being discharged  Is drinking ensures and is refusing food at this time  Also is refusing lab work and vital signs  Patient was asked numerous times to be cooperative however she is defiant  Refused to go to groups and is often seclusive to her room  Does appear to have some truncal shaking and abnormal movements but denies this as being something new  Does not want to take Cogentin  Is taking her Zyprexa as prescribed  Tentative discharge tomorrow with collaboration of group home        Current Medications:  Current Facility-Administered Medications   Medication Dose Route Frequency    acetaminophen (TYLENOL) tablet 325 mg  325 mg Oral Q6H PRN    acetaminophen (TYLENOL) tablet 650 mg  650 mg Oral Q4H PRN    acetaminophen (TYLENOL) tablet 975 mg  975 mg Oral Q6H PRN    aluminum-magnesium hydroxide-simethicone (MYLANTA) 200-200-20 mg/5 mL oral suspension 30 mL  30 mL Oral Q4H PRN    benztropine (COGENTIN) injection 1 mg  1 mg Intramuscular Q6H PRN    benztropine (COGENTIN) tablet 1 mg  1 mg Oral Q6H PRN    docusate sodium (COLACE) capsule 100 mg  100 mg Oral BID PRN    hydrOXYzine HCL (ATARAX) tablet 25 mg  25 mg Oral Q6H PRN    hydrOXYzine HCL (ATARAX) tablet 50 mg  50 mg Oral Q6H PRN    LORazepam (ATIVAN) 2 mg/mL injection 2 mg  2 mg Intramuscular Q6H PRN    LORazepam (ATIVAN) tablet 1 mg  1 mg Oral Q6H PRN    OLANZapine (ZyPREXA ZYDIS) dispersible tablet 10 mg  10 mg Oral QPM    OLANZapine (ZyPREXA ZYDIS) dispersible tablet 5 mg  5 mg Oral Daily    OLANZapine (ZyPREXA) IM injection 10 mg  10 mg Intramuscular Q8H PRN    OLANZapine (ZyPREXA) tablet 10 mg  10 mg Oral Q8H PRN    risperiDONE (RisperDAL M-TABS) dispersible tablet 1 mg  1 mg Oral Q3H PRN    traZODone (DESYREL) tablet 50 mg  50 mg Oral HS PRN       Behavioral Health Medications: all current active meds have been reviewed and continue current psychiatric medications  Vitals:  Vitals:    10/29/19 0612   SpO2: 97%       Laboratory results:    I have personally reviewed all pertinent laboratory/tests results    Most Recent Labs:   Lab Results   Component Value Date    WBC 6 42 10/28/2019    RBC 3 74 (L) 10/28/2019    HGB 11 3 (L) 10/28/2019    HCT 34 8 10/28/2019     10/28/2019    RDW 13 6 10/28/2019    NEUTROABS 4 78 10/28/2019    SODIUM 131 (L) 10/28/2019    K 3 4 (L) 10/28/2019    CL 98 (L) 10/28/2019    CO2 21 10/28/2019    BUN 2 (L) 10/28/2019    CREATININE 0 81 10/28/2019    GLUC 222 (H) 10/28/2019    CALCIUM 7 8 (L) 10/28/2019    AST 32 10/28/2019    ALT 25 10/28/2019    ALKPHOS 47 10/28/2019    TP 7 4 10/28/2019    ALB 3 9 10/28/2019    TBILI 0 80 10/28/2019    CHOLESTEROL 192 10/05/2019    HDL 72 (H) 10/05/2019    TRIG 98 10/05/2019    LDLCALC 100 10/05/2019    NONHDLC 120 10/05/2019    [id number] 3 291 10/05/2019    PREGSERUM Negative 10/05/2019       Psychiatric Review of Systems:  Behavior over the last 24 hours:  improved  Sleep: normal  Appetite: normal  Medication side effects: Yes, possible EPS  ROS: no complaints    Mental Status Evaluation:  Appearance:  casually dressed   Behavior:  guarded and uncooperative   Speech:  normal pitch and normal volume   Mood:  dysthymic   Affect:  increased in range   Language sparse   Thought Process:  concrete and goal directed   Thought Content:  obsessions, chronic underlying paranoia   Perceptual Disturbances: None   Risk Potential: Denied SI/HI  Potential for aggression no   Sensorium:  person, place and time/date   Cognition:  grossly intact   Consciousness:  alert and awake    Recent and Remote Memory fair   Attention: attention span appeared shorter than expected for age   Insight:  limited   Judgment: slightly improved   Gait/Station: normal gait/station and normal balance   Motor Activity: Truncal movements     Progress Toward Goals:  Some progression    Recommended Treatment: Continue with group therapy, milieu therapy and occupational therapy  1   Continue current medications  2  Disposition planning with tentative discharge tomorrow to group home    Risks, benefits and possible side effects of Medications:   Risks, benefits, and possible side effects of medications explained to patient and patient verbalizes understanding        Vanessa Armas PA-C

## 2019-11-05 NOTE — CASE MANAGEMENT
Savannah Marcial from [facility] met with pt in preparation for dc on Wednesday  CM will continue to follow

## 2019-11-05 NOTE — PROGRESS NOTES
Sitting in room  Refused labs and VS   Attempted to speak with patient but she responded "I don't want to talk about it"

## 2019-11-05 NOTE — PROGRESS NOTES
11/05/19 0739   Team Meeting   Meeting Type Daily Rounds   Team Members Present   Team Members Present Physician;Nurse;;Occupational Therapist   Physician Team Member Jean Bernal   Nursing Team Member NAHID [facility] Management Team Member Mark/ [address]   OT Team Member Darwin   Patient/Family Present   Patient Present No   Patient's Family Present No     Refused labs  Pt is still struggling with eating meals  Irritable  Plan will be for pt to return to [facility] with outpatient at [facility]  No dc date yet

## 2019-11-05 NOTE — CASE MANAGEMENT
CM met with pt to discuss plan for dc for Wednesday  Pt is agreeable to dc and feels ready  Pt reports that she is not suicidal  Pt still remains with fixed desire to move into a homeless shelter for women at time of dc  Mother and legal guardian will not support this planning  Pt reports that she still does not feel ready to eat but she is happy to consume the protein drinks  Pt reports understanding that she will follow up with Dr Quin Pham on Thursday morning  Pt will go home with Julieth Olmstead back to [facility] around 10 in the morning  Pt is still not interested in a referral for therapy at this time  Pt reluctantly will meet with Dr Quin Pham for medication management  CM faxed over scripts to [facility] along with discontinuation order for the Prozac  No further needs anticipated at time of dc  Pt provided with relapse prevention plan and crisis numbers in [facility]  No further needs anticipated at time of dc

## 2019-11-05 NOTE — PLAN OF CARE
Problem: PSYCHOSIS  Goal: Will report no hallucinations or delusions  Description  Interventions:  - Administer medication as  ordered  - Every waking shifts and PRN assess for the presence of hallucinations and or delusions  - Assist with reality testing to support increasing orientation  - Assess if patient's hallucinations or delusions are encouraging self-harm or harm to others and intervene as appropriate  Outcome: Progressing     Problem: SELF CARE DEFICIT  Goal: Return ADL status to a safe level of function  Description  INTERVENTIONS:  - Administer medication as ordered  - Assess ADL deficits and provide assistive devices as needed  - Obtain PT/OT consults as needed  - Assist and instruct patient to increase activity and self care as tolerated  Outcome: Progressing     Problem: SELF HARM/SUICIDALITY  Goal: Will have no self-injury during hospital stay  Description  INTERVENTIONS:  - Q 15 MINUTES: Routine safety checks  - Q WAKING SHIFT & PRN: Assess risk to determine if routine checks are adequate to maintain patient safety  - Encourage patient to participate actively in care by formulating a plan to combat response to suicidal ideation, identify supports and resources  Outcome: Progressing     Problem: Nutrition/Hydration-ADULT  Goal: Nutrient/Hydration intake appropriate for improving, restoring or maintaining nutritional needs  Description  Monitor and assess patient's nutrition/hydration status for malnutrition  Collaborate with interdisciplinary team and initiate plan and interventions as ordered  Monitor patient's weight and dietary intake as ordered or per policy  Utilize nutrition screening tool and intervene as necessary  Determine patient's food preferences and provide high-protein, high-caloric foods as appropriate       INTERVENTIONS:  - Monitor oral intake, urinary output, labs, and treatment plans  - Assess nutrition and hydration status and recommend course of action  - Evaluate amount of meals eaten  - Assist patient with eating if necessary   - Allow adequate time for meals  - Recommend/ encourage appropriate diets, oral nutritional supplements, and vitamin/mineral supplements  - Order, calculate, and assess calorie counts as needed  - Recommend, monitor, and adjust tube feedings and TPN/PPN based on assessed needs  - Assess need for intravenous fluids  - Provide specific nutrition/hydration education as appropriate  - Include patient/family/caregiver in decisions related to nutrition  Outcome: Progressing     Problem: Ineffective Coping  Goal: Participates in unit activities  Description  Interventions:  - Provide therapeutic environment   - Provide required programming   - Redirect inappropriate behaviors   Outcome: Progressing     Problem: DISCHARGE PLANNING  Goal: Discharge to home or other facility with appropriate resources  Description  INTERVENTIONS:  - Identify barriers to discharge w/patient and caregiver  - Arrange for needed discharge resources and transportation as appropriate  - Identify discharge learning needs (meds, wound care, etc )  - Arrange for interpretive services to assist at discharge as needed  - Refer to Case Management Department for coordinating discharge planning if the patient needs post-hospital services based on physician/advanced practitioner order or complex needs related to functional status, cognitive ability, or social support system  Outcome: Progressing

## 2019-11-05 NOTE — DISCHARGE INSTR - OTHER ORDERS
If you are in a Crisis situation Call [phone] to speak to a trained crisis worker - Anytime - Day or Night  You may also call one of the numbers below:  Ramon Webber:   [phone]

## 2019-11-06 VITALS
SYSTOLIC BLOOD PRESSURE: 101 MMHG | DIASTOLIC BLOOD PRESSURE: 73 MMHG | TEMPERATURE: 97.6 F | HEART RATE: 84 BPM | BODY MASS INDEX: 22.33 KG/M2 | OXYGEN SATURATION: 97 % | WEIGHT: 134.19 LBS | RESPIRATION RATE: 16 BRPM

## 2019-11-06 PROCEDURE — 99238 HOSP IP/OBS DSCHRG MGMT 30/<: CPT | Performed by: PHYSICIAN ASSISTANT

## 2019-11-06 RX ADMIN — OLANZAPINE 5 MG: 5 TABLET, ORALLY DISINTEGRATING ORAL at 09:41

## 2019-11-06 NOTE — DISCHARGE INSTRUCTIONS
Anorexia in Older Adults   WHAT YOU NEED TO KNOW:   What is anorexia? Anorexia is a loss of appetite, decreased food intake, or both  Your appetite naturally decreases as you get older  You also get full faster than you used to  This occurs because your body needs less energy  Other natural body changes can also lead to a decreased appetite  Even though some appetite loss is normal, you still need to get enough calories and nutrients to keep you healthy  You can start to lose too much weight if you do not eat as much food as your body needs  Unwanted weight loss can cause health problems, or worsen health problems you already have  You can also become dehydrated if you do not drink enough liquid  What causes anorexia in older adults? · Decreased sense of taste and smell    · Living alone    · Change in environment, such as moving to a nursing home    · Low income    · Dental problems, dentures that do not fit well, or chewing or swallowing problems    · Medical conditions that affect your appetite, such as cancer, depression, dementia, or alcoholism    · Medical conditions that affect your ability to prepare food or eat, such as poor eyesight or Parkinson disease    · Medicines that decrease your appetite or sense of taste and smell    · Alcoholism or other substance abuse  How can I eat healthy and get enough nutrients? · Choose healthy foods  Eat a variety of fruits, vegetables, whole grains, low-fat dairy foods, lean meats, and other protein foods  Limit foods high in fat, sugar, and salt  Limit or avoid alcohol as directed  Work with a dietitian to help you plan your meals if you need to follow a special diet  A dietitian can also teach you how to modify foods if you have trouble chewing or swallowing  · Snack on healthy foods between meals  if you only eat a small amount during meals  Snacks provide extra healthy nutrients and calories between meals   Examples include fruit, cheese, and whole grain crackers  · Drink liquids as directed  to avoid dehydration  Drink liquids between meals if they cause you to get full too quickly during meals  Ask how much liquid to drink each day and which liquids are best for you  · Use herbs, spices, and flavor enhancers to add flavor to foods  Avoid using herbs and spice blends that also contain sodium  Ask your healthcare provider or dietitian about flavor enhancers  Flavor enhancers with ham, natural martell, and roast beef flavors can also be sprinkled on food to add flavor  · Share meals with others as often as you can  Eating with others may help you to eat better during meal time  Ask family members, neighbors, or friends to join you for lunch  There are also senior centers where you can meet people, and share meals with them  · Ask family and friends for help  with shopping or preparing foods  Ask for a ride to the grocery store, if needed  How can I work with my healthcare provider to stay healthy? · Tell your healthcare provider about any illnesses or medicines that have decreased your appetite  He may be able to change your medicines  Your healthcare provider may also be able to prescribe medicines that can increase your appetite  · Ask your healthcare provider about nutrition supplements  you can have between meals  Nutrition supplements can provide extra calories and nutrients if you are not getting enough through food  Nutrition supplements are available in liquids, puddings, bars, and soups  · Talk to your healthcare provider about safe physical activities you can do  Physical activity may help to increase your appetite  It can also help to strengthen your muscles and bones  · Ask your healthcare provider about programs that can help you buy food or provide meals  There are programs that provide financial assistance for food if you have a low income   There are also programs in some areas that may be able to deliver healthy prepared meals to your home  When should I contact my healthcare provider? · You are losing weight  · You feel depressed, confused, tired, irritable, and you do not feel like eating  · You have signs of dehydration  Examples include dark yellow urine, dry mouth and lips, dry skin, fast heartbeat, and urinating less than usual     · You have questions or concerns about your condition or care  CARE AGREEMENT:   You have the right to help plan your care  Learn about your health condition and how it may be treated  Discuss treatment options with your caregivers to decide what care you want to receive  You always have the right to refuse treatment  The above information is an  only  It is not intended as medical advice for individual conditions or treatments  Talk to your doctor, nurse or pharmacist before following any medical regimen to see if it is safe and effective for you  © 2017 [address] [employer] Information is for End User's use only and may not be sold, redistributed or otherwise used for commercial purposes  All illustrations and images included in CareNotes® are the copyrighted property of [employer] , [employer]  or Matthew Albarran  Malnutrition   WHAT YOU NEED TO KNOW:   Malnutrition occurs when you do not get enough calories or nutrients to keep you healthy  Nutrients include protein, fat, carbohydrates, vitamins, and minerals  DISCHARGE INSTRUCTIONS:   Medicines: You may need any of the following:  · Vitamins and minerals  may be needed to replace vitamins and minerals your body needs  They may be given in your IV, as a shot, or as a pill  · Appetite stimulants  are medicines that help improve your appetite so you will want to eat more  · Take your medicine as directed  Contact your healthcare provider if you think your medicine is not helping or you have side effects  Tell him if you are allergic to any medicine   Keep a list of the medicines, vitamins, and herbs you take  Include the amounts, and when and why you take them  Bring the list or the pill bottles to follow-up visits  Carry your medicine list with you in case of an emergency  Follow up with your healthcare provider as directed:  Write down your questions so you remember to ask them during your visits  Self-care:   · Increase calories and nutrients  A dietitian may help you plan larger, healthy meals  If you have trouble eating larger meals, eat small meals throughout the day  You may need to include snacks between meals  You may need to eat or drink a nutrition supplement if you have trouble eating the right kinds and amounts of food  · Find support  If you cannot buy or prepare the right kinds of foods, talk to your healthcare provider  Ask for information about community programs that can help you  Contact your healthcare provider if:   · You lose a large amount of weight within a short amount of time  · You feel depressed, confused, tired, irritable, and you do not feel like eating  · You have questions or concerns about your condition or care  Seek care immediately or call 911 if:   · You have pain in your chest, back, neck, jaw, stomach, or down one or both arms  · You have shortness of breath  © 2017 [facility] Information is for End User's use only and may not be sold, redistributed or otherwise used for commercial purposes  All illustrations and images included in CareNotes® are the copyrighted property of [employer] , [employer]  or Matthew Albarran  The above information is an  only  It is not intended as medical advice for individual conditions or treatments  Talk to your doctor, nurse or pharmacist before following any medical regimen to see if it is safe and effective for you  Schizophrenia   WHAT YOU NEED TO KNOW:   Schizophrenia is a long-term mental disease that affects how your brain works   It is a disease that may change how you think, feel, and behave  You may not be able to know what is real and what is not real  Your thoughts may not be clear, or may jump from one topic to another  DISCHARGE INSTRUCTIONS:   Medicines:   · Antipsychotics: These help decrease psychotic symptoms and severe agitation  You may need antiparkinson medicine to control muscle stiffness, twitches, and restlessness caused by antipsychotic medicines  · Antianxiety medicine: This medicine may be given to decrease anxiety and help you feel calm and relaxed  · Antidepressants: These help with symptoms of depression and anxiety  · Mood stabilizers: These control mood swings  · Tranquilizers: These increase feelings of being calm and relaxed  · Take your medicine as directed  Contact your healthcare provider if you think your medicine is not helping or if you have side effects  Tell him or her if you are allergic to any medicine  Keep a list of the medicines, vitamins, and herbs you take  Include the amounts, and when and why you take them  Bring the list or the pill bottles to follow-up visits  Carry your medicine list with you in case of an emergency  Follow up with your healthcare provider or psychiatrist as directed:  Write down your questions so you remember to ask them during your visits  Treatment settings: You may need to continue your treatments after you leave the hospital  You may be treated in the following programs:  · Crisis residential program:  This is a program where you live in a home-care facility  Healthcare providers work in these homes just like in hospitals  This program is helpful especially when you are having a relapse (your symptoms return)  · Day treatment program:  This program provides a chance to learn and practice skills  This also provides long-term support so you may have an improved quality of life      · Outpatient program:  An outpatient program is when you meet regularly with your therapist  Leydi Santiago may meet one-to-one with your therapist, or you might meet with your therapist in a group  · Partial care program:  A partial care program is also called day hospitalization or partial hospitalization  This is group therapy and lasts 4 to 6 hours a day, 3 to 5 days a week  It may help you avoid going into the hospital or help you get out of the hospital sooner  It may also help you get symptoms under control and avoid a relapse  Therapy:   · Assertive community treatment:  A team of healthcare providers or psychiatrists and support groups in your community help you with your therapy  · Cognitive behavior therapy: This therapy helps you to change certain behaviors  It will help you handle symptoms such as hallucinations and delusions  · Illness-management skills: This type of therapy teaches you what you can do to help manage your disease  · Family psychoeducation:  Your family will be part of your therapy  · Social skills training: This training helps you learn how to get along with other people  · Supported employment: This is a form of therapy where you are placed into a job that fits your skills  It will help give you independence and self-confidence  Manage your symptoms:   · Do not stop taking your medicines:  Tell your healthcare provider or psychiatrist if you have any problems with or questions about your medicines  · Do not stop your therapies: It is normal to have doubts about or feel discomfort with your therapy  Tell your healthcare provider or psychiatrist if you are not comfortable or have questions about your therapies  · Get regular sleep:  Try to get 6 to 8 hours of sleep each night  Tell your healthcare provider or psychiatrist if you are not able to sleep, or if you are sleeping too much  · Do not drink alcohol:  Alcohol interacts with medicine used to treat schizophrenia  For support and more information:   · [facility]  [address]   [facility]  [address] [facility] , [address] [place] [address]  Phone: [phone]  Phone: [phone]  Web Address: http://www [employer] [place]/  org  · [address] [facility], Public Information & Communication Branch  [address], [address], [address]  [place] [address]   Phone: [phone]  Phone: [phone]  Web Address: Cristal carney  Contact your healthcare provider or psychiatrist if:   · You feel that you are having symptoms of schizophrenia  · You are not able to sleep well, or are sleeping more than usual     · You cannot eat or are eating more than usual     · You have questions or concerns about your condition or care  Seek care immediately or call 911 if:   · You think about killing yourself or someone else  · You have a rash, swelling, or trouble breathing after you take your medicine  © 2017 [address] [facility] Information is for End User's use only and may not be sold, redistributed or otherwise used for commercial purposes  All illustrations and images included in CareNotes® are the copyrighted property of [employer] , [employer]  or Matthew Albarran  The above information is an  only  It is not intended as medical advice for individual conditions or treatments  Talk to your doctor, nurse or pharmacist before following any medical regimen to see if it is safe and effective for you

## 2019-11-06 NOTE — BH TRANSITION RECORD
Contact Information: If you have any questions, concerns, pended studies, tests and/or procedures, or emergencies regarding your inpatient behavioral health visit  Please contact [facility] [phone] and ask to speak to a , nurse or physician  A contact is available 24 hours/ 7 days a week at this number  Summary of Procedures Performed During your Stay:  Below is a list of major procedures performed during your hospital stay and a summary of results:  - No major procedures performed  Pending Studies (From admission, onward)     Start     Ordered    11/06/19 0859  Basic metabolic panel  Morning draw     Comments:  refused      11/05/19 1283              If studies are pending at discharge, follow up with your PCP and/or referring provider

## 2019-11-06 NOTE — PROGRESS NOTES
11/06/19 0737   Team Meeting   Meeting Type Daily Rounds   Team Members Present   Team Members Present Physician;Nurse;;Occupational Therapist   Physician Team Member Trenton Marie   Nursing Team Member NAHID [facility] Management Team Member Mark/ Kasandra[address]   OT Team Member Darwin   Patient/Family Present   Patient Present No   Patient's Family Present No     Refused labs and vitals  Pt showered  Drinking ensure  Pt will return to [facility] today  Pt will see Dr Fabio Anguiano on Thursday morning  Pt is still not agreeable for a referral for therapist  Sue Umanzor sent to [facility]

## 2019-11-06 NOTE — PROGRESS NOTES
Pt alert, acknowledged writer  Asked pt how she was, she said that she didn't want to talk  Seclusive to room

## 2019-11-06 NOTE — DISCHARGE SUMMARY
Discharge Summary - [facility] 47 y o  female MRN: [id number]  Unit/Bed#: Jessy Lux 021-50 Encounter: [id number]     Admission Date:   Admission Orders (From admission, onward)     Ordered        10/29/19 1149  Admit Patient to [facility] (use in Admission Navigator for ED to [address] [place] [address])  Once                         Discharge Date: 11/6/19    Attending Psychiatrist: Carson Kirkpatrick MD, Fatou Mercado MD    Reason for Admission/HPI:   History of Present Illness     Patient is a 53-year-old female who presented to [facility] ED by group [place] staff due to not eating food for 7 days and not drinking water for 1 day  At that time patient denied suicidal and homicidal ideation and requested to be discharged to homeless shelter  Patient currently lives at [address] [place] [address] and her mother is her legal guardian  Patient is not allowed to make decisions without her mother making them for her  Patient has history of schizophrenia and eating disorder  Patient had near identical presentation to [facility] ED on 10/3/19  At that time patient was admitted to [place] [address] unit for electrolyte abnormalities  During that time patient was uncooperative and made involuntary 302 status  She was then transferred to [address] [place] [address] unit  At that time patient refused to eat solid foods and only would drink Ensure  She did eat animal crackers as well  After being discharged patient continued with eating disorder behavior  She reported not eating because she "just does not want to "  It was reported later that patient stopped taking medications after discharge and continued with eating disorder  Patient reported she is frustrated and did not believe she has a mental illness  She was focused on being discharged to a homeless shelter where legally she cannot do that  She denied most psychiatric symptoms, was irritable, and again was ultimately uncooperative  Patient reported being religiously preoccupied and as a child of God  On initial psychiatric of interview patient was described to be child-like and regressed with poor insight and judgment  She stated since stopping medications he was unable to sleep and was not eating  She would not elaborate why she stopped medications  She also would not allow any vital signs or lab work  She did appear disorganized and paranoid  She did have obsessions about not eating  Patient was negatively speaking about her mother  She did report having suicidal thoughts at that time  Patient has numerous inpatient psychiatric hospitalizations, denied prior suicide attempts, and does have outpatient psychiatrist at group home  Psychosocial Stressors: chronic mental illness      Hospital Course:   Behavioral Health Medications:   current meds:   Current Facility-Administered Medications   Medication Dose Route Frequency    acetaminophen (TYLENOL) tablet 325 mg  325 mg Oral Q6H PRN    acetaminophen (TYLENOL) tablet 650 mg  650 mg Oral Q4H PRN    acetaminophen (TYLENOL) tablet 975 mg  975 mg Oral Q6H PRN    aluminum-magnesium hydroxide-simethicone (MYLANTA) 200-200-20 mg/5 mL oral suspension 30 mL  30 mL Oral Q4H PRN    benztropine (COGENTIN) injection 1 mg  1 mg Intramuscular Q6H PRN    benztropine (COGENTIN) tablet 1 mg  1 mg Oral Q6H PRN    docusate sodium (COLACE) capsule 100 mg  100 mg Oral BID PRN    hydrOXYzine HCL (ATARAX) tablet 25 mg  25 mg Oral Q6H PRN    hydrOXYzine HCL (ATARAX) tablet 50 mg  50 mg Oral Q6H PRN    LORazepam (ATIVAN) 2 mg/mL injection 2 mg  2 mg Intramuscular Q6H PRN    LORazepam (ATIVAN) tablet 1 mg  1 mg Oral Q6H PRN    OLANZapine (ZyPREXA ZYDIS) dispersible tablet 10 mg  10 mg Oral QPM    OLANZapine (ZyPREXA ZYDIS) dispersible tablet 5 mg  5 mg Oral Daily    OLANZapine (ZyPREXA) IM injection 10 mg  10 mg Intramuscular Q8H PRN    OLANZapine (ZyPREXA) tablet 10 mg  10 mg Oral Q8H PRN    risperiDONE (RisperDAL M-TABS) dispersible tablet 1 mg  1 mg Oral Q3H PRN    traZODone (DESYREL) tablet 50 mg  50 mg Oral HS PRN       Patient was admitted to [facility] inpatient psychiatric unit on voluntary 201 commitment for safety and stabilization  On admission patient was restarted on Zyprexa 5mg QD AM + 10mg HS for mood stabilization/psychosis  She did not require titration of medications  She refused additional medications, lab work, and vital signs  Patient was often defiant  She remained regressed and child-like throughout hospitalization  She tolerated medications with no acute side effects  Nutrition followed patient and was giving ensure drinks  She refused to eat solid food  She was seen drinking fluids  She often was seclusive to her room, not attending groups, not interactive with her peers  She did not demonstrate dangerous behavior to self or others during her inpatient stay  Patient reluctantly agreed to return to group home  She was made aware that the hospital cannot change her current legal status  She also was made aware that she is not allowed to go to homeless shelter  On day of discharge patient remained with poor insight, had improved judgement, denied depressive symptoms, denied suicidal thoughts, denied homicidal thoughts, denies psychosis, was less paranoid, was not manic, was not agitated, and had decreased anxiety  Treatment team collaborated with [facility] and they agreed for return  Patient refused long-acting injectable  Mental Status at time of Discharge:     Appearance:  casually dressed   Behavior:  child-like   Speech:  normal pitch and normal volume   Mood:  "okay"   Affect:  broad   Thought Process:  concrete, goal directed and perserverative   Thought Content:  obsessions and decreased paranoia   Perceptual Disturbances: None   Risk Potential: Denied SI/HI    Potential for aggression: No   Sensorium:  person, place and time/date Cognition:  grossly intact   Consciousness:  alert and awake    Attention: attention span appeared shorter than expected for age   Insight:  limited   Judgment: improved   Gait/Station: normal gait/station and normal balance   Motor Activity: no abnormal movements       Discharge Diagnosis:   Schizophrenia   Eating disorder      Discharge Medications:  See after visit summary for reconciled discharge medications provided to patient and family  Discharge instructions/Information to patient and family:   See after visit summary for information provided to patient and family  Provisions for Follow-Up Care:  See after visit summary for information related to follow-up care and any pertinent home health orders  Discharge Statement   I spent 32 minutes discharging the patient  This time was spent on the day of discharge  I had direct contact with the patient on the day of discharge  On day of discharge patient had mental status exam performed, discharge instructions/medications reviewed, and outpatient planning discussed  She was given 2 weeks of scripts faxed to pharmacy       Senait See SHARON

## 2019-11-06 NOTE — NURSING NOTE
Went over AVS with pt  Script of Zyprexa was faxed already  Pt left with belongings with [facility]

## 2019-11-06 NOTE — PROGRESS NOTES
Pt slept until around 0400  Since this time, pt has been awake  Frequently walking around room and at times appears to be responding to internal stimuli  Denies concerns  No irritability

## 2020-12-22 NOTE — PROGRESS NOTES
Patient states that she does not want to go to behavior health unit and she does not want to speak with the psychiatrist  Pt's mother Bin called and stated she would like a call back from the nurse to discuss penis problem. Pt's mother can be reached at [phone]
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
## 2025-05-19 NOTE — PROGRESS NOTES
HR=94 bpm, WVTI=451/99 mmhg, SpO2=95.0 %, Resp=16 B/min, EtCO2=21 mmHg, Apnea=10 Seconds Pt sitting in bed, appears to be bothered about something but repeatedly stated "I don't want to talk about it" when asked if she is feeling ok  Informed patient that we are here to help